# Patient Record
Sex: FEMALE | Race: WHITE | NOT HISPANIC OR LATINO | Employment: OTHER | URBAN - METROPOLITAN AREA
[De-identification: names, ages, dates, MRNs, and addresses within clinical notes are randomized per-mention and may not be internally consistent; named-entity substitution may affect disease eponyms.]

---

## 2023-03-08 DIAGNOSIS — F32.A DEPRESSION, UNSPECIFIED DEPRESSION TYPE: Primary | ICD-10-CM

## 2023-03-08 RX ORDER — SERTRALINE HYDROCHLORIDE 100 MG/1
100 TABLET, FILM COATED ORAL DAILY
Qty: 30 TABLET | Refills: 0 | Status: SHIPPED | OUTPATIENT
Start: 2023-03-08 | End: 2023-03-14 | Stop reason: SDUPTHER

## 2023-03-08 RX ORDER — SERTRALINE HYDROCHLORIDE 100 MG/1
TABLET, FILM COATED ORAL
COMMUNITY
Start: 2016-11-01 | End: 2023-03-08 | Stop reason: SDUPTHER

## 2023-03-08 NOTE — TELEPHONE ENCOUNTER
Requested Prescriptions     Pending Prescriptions Disp Refills    sertraline (Zoloft) 100 mg tablet       Sig: Take by mouth.

## 2023-03-14 DIAGNOSIS — F32.A DEPRESSION, UNSPECIFIED DEPRESSION TYPE: ICD-10-CM

## 2023-03-14 NOTE — TELEPHONE ENCOUNTER
Pt takes 2 sertraline 100 mg tablets at bedtime every day. Was sent 100mg tab w once daily sig.    Requested Prescriptions     Pending Prescriptions Disp Refills    sertraline (Zoloft) 100 mg tablet 60 tablet 0     Sig: Take 2 tablets (200 mg) by mouth once daily at bedtime.

## 2023-03-15 RX ORDER — SERTRALINE HYDROCHLORIDE 100 MG/1
200 TABLET, FILM COATED ORAL NIGHTLY
Qty: 60 TABLET | Refills: 0 | Status: SHIPPED | OUTPATIENT
Start: 2023-03-15 | End: 2023-04-18 | Stop reason: SDUPTHER

## 2023-04-18 ENCOUNTER — OFFICE VISIT (OUTPATIENT)
Dept: PRIMARY CARE | Facility: CLINIC | Age: 68
End: 2023-04-18
Payer: MEDICARE

## 2023-04-18 VITALS
DIASTOLIC BLOOD PRESSURE: 72 MMHG | RESPIRATION RATE: 16 BRPM | BODY MASS INDEX: 25.24 KG/M2 | OXYGEN SATURATION: 95 % | TEMPERATURE: 97.9 F | WEIGHT: 138 LBS | HEART RATE: 58 BPM | SYSTOLIC BLOOD PRESSURE: 120 MMHG

## 2023-04-18 DIAGNOSIS — I48.91 ATRIAL FIBRILLATION, UNSPECIFIED TYPE (MULTI): ICD-10-CM

## 2023-04-18 DIAGNOSIS — F32.A DEPRESSION, UNSPECIFIED DEPRESSION TYPE: ICD-10-CM

## 2023-04-18 DIAGNOSIS — Z00.00 ROUTINE GENERAL MEDICAL EXAMINATION AT HEALTH CARE FACILITY: Primary | ICD-10-CM

## 2023-04-18 DIAGNOSIS — E78.5 HYPERLIPIDEMIA, UNSPECIFIED HYPERLIPIDEMIA TYPE: ICD-10-CM

## 2023-04-18 DIAGNOSIS — N18.31 STAGE 3A CHRONIC KIDNEY DISEASE (MULTI): ICD-10-CM

## 2023-04-18 DIAGNOSIS — I25.810 CORONARY ARTERY DISEASE INVOLVING CORONARY BYPASS GRAFT OF NATIVE HEART, UNSPECIFIED WHETHER ANGINA PRESENT: ICD-10-CM

## 2023-04-18 DIAGNOSIS — J44.9 CHRONIC OBSTRUCTIVE PULMONARY DISEASE, UNSPECIFIED COPD TYPE (MULTI): ICD-10-CM

## 2023-04-18 DIAGNOSIS — F43.9 STRESS AT HOME: ICD-10-CM

## 2023-04-18 DIAGNOSIS — E55.9 VITAMIN D DEFICIENCY: ICD-10-CM

## 2023-04-18 DIAGNOSIS — I10 HYPERTENSION, UNSPECIFIED TYPE: ICD-10-CM

## 2023-04-18 DIAGNOSIS — F41.1 GENERALIZED ANXIETY DISORDER: ICD-10-CM

## 2023-04-18 PROCEDURE — G0439 PPPS, SUBSEQ VISIT: HCPCS | Performed by: FAMILY MEDICINE

## 2023-04-18 PROCEDURE — 99214 OFFICE O/P EST MOD 30 MIN: CPT | Performed by: FAMILY MEDICINE

## 2023-04-18 RX ORDER — APIXABAN 5 MG/1
1 TABLET, FILM COATED ORAL 2 TIMES DAILY
COMMUNITY
Start: 2023-02-14 | End: 2023-04-18

## 2023-04-18 RX ORDER — LOSARTAN POTASSIUM 25 MG/1
25 TABLET ORAL DAILY
COMMUNITY

## 2023-04-18 RX ORDER — FLUTICASONE PROPIONATE AND SALMETEROL 250; 50 UG/1; UG/1
1 POWDER RESPIRATORY (INHALATION)
COMMUNITY

## 2023-04-18 RX ORDER — ATORVASTATIN CALCIUM 40 MG/1
TABLET, FILM COATED ORAL
COMMUNITY
Start: 2023-04-15

## 2023-04-18 RX ORDER — ALENDRONATE SODIUM 70 MG/1
TABLET ORAL
COMMUNITY
Start: 2021-08-23

## 2023-04-18 RX ORDER — IPRATROPIUM BROMIDE AND ALBUTEROL SULFATE 2.5; .5 MG/3ML; MG/3ML
3 SOLUTION RESPIRATORY (INHALATION)
COMMUNITY

## 2023-04-18 RX ORDER — SERTRALINE HYDROCHLORIDE 100 MG/1
200 TABLET, FILM COATED ORAL NIGHTLY
Qty: 180 TABLET | Refills: 1 | Status: SHIPPED | OUTPATIENT
Start: 2023-04-18 | End: 2023-10-09

## 2023-04-18 RX ORDER — FUROSEMIDE 40 MG/1
80 TABLET ORAL DAILY
COMMUNITY

## 2023-04-18 RX ORDER — NAPROXEN SODIUM 220 MG/1
81 TABLET, FILM COATED ORAL DAILY
COMMUNITY
Start: 2022-12-27

## 2023-04-18 RX ORDER — POTASSIUM CHLORIDE 750 MG/1
10 TABLET, FILM COATED, EXTENDED RELEASE ORAL 2 TIMES DAILY
COMMUNITY

## 2023-04-18 RX ORDER — PANTOPRAZOLE SODIUM 20 MG/1
40 TABLET, DELAYED RELEASE ORAL 2 TIMES DAILY
COMMUNITY
End: 2024-03-11 | Stop reason: SDUPTHER

## 2023-04-18 RX ORDER — ALBUTEROL SULFATE 90 UG/1
AEROSOL, METERED RESPIRATORY (INHALATION)
COMMUNITY

## 2023-04-18 RX ORDER — DOFETILIDE 0.12 MG/1
125 CAPSULE ORAL 2 TIMES DAILY
COMMUNITY

## 2023-04-18 RX ORDER — AMLODIPINE BESYLATE 2.5 MG/1
1 TABLET ORAL DAILY
COMMUNITY
Start: 2022-12-15

## 2023-04-18 RX ORDER — ALBUTEROL SULFATE 0.83 MG/ML
SOLUTION RESPIRATORY (INHALATION)
COMMUNITY
Start: 2022-06-24

## 2023-04-18 RX ORDER — METOPROLOL SUCCINATE 50 MG/1
50 TABLET, EXTENDED RELEASE ORAL 2 TIMES DAILY
COMMUNITY

## 2023-04-18 ASSESSMENT — PATIENT HEALTH QUESTIONNAIRE - PHQ9
2. FEELING DOWN, DEPRESSED OR HOPELESS: SEVERAL DAYS
10. IF YOU CHECKED OFF ANY PROBLEMS, HOW DIFFICULT HAVE THESE PROBLEMS MADE IT FOR YOU TO DO YOUR WORK, TAKE CARE OF THINGS AT HOME, OR GET ALONG WITH OTHER PEOPLE: NOT DIFFICULT AT ALL
SUM OF ALL RESPONSES TO PHQ9 QUESTIONS 1 AND 2: 1
1. LITTLE INTEREST OR PLEASURE IN DOING THINGS: NOT AT ALL

## 2023-04-18 ASSESSMENT — ENCOUNTER SYMPTOMS
OCCASIONAL FEELINGS OF UNSTEADINESS: 0
DEPRESSION: 0
LOSS OF SENSATION IN FEET: 0

## 2023-04-18 ASSESSMENT — ACTIVITIES OF DAILY LIVING (ADL)
MANAGING_FINANCES: INDEPENDENT
DRESSING: INDEPENDENT
BATHING: INDEPENDENT
GROCERY_SHOPPING: INDEPENDENT
TAKING_MEDICATION: INDEPENDENT
DOING_HOUSEWORK: INDEPENDENT

## 2023-04-18 NOTE — PROGRESS NOTES
Subjective   Reason for Visit: Lit Mackay is an 67 y.o. female here for a Medicare Wellness visit.          Reviewed all medications by prescribing practitioner or clinical pharmacist (such as prescriptions, OTCs, herbal therapies and supplements) and documented in the medical record.    HPI    Patient Self Assessment of Health Status  Patient Self Assessment: Fair    Nutrition and Exercise  Current Diet: Well Balanced Diet  Adequate Fluid Intake: Yes  Caffeine: Yes  Exercise Frequency: Regularly         Home Safety Risk Factors: None    Patient Care Team:  Rob Gabriel DO as PCP - General  [facility]     Review of Systems    Objective   Vitals:  /72   Pulse 58   Temp 36.6 °C (97.9 °F)   Resp 16   Wt 62.6 kg (138 lb)   LMP  (LMP Unknown)   SpO2 95%   BMI 25.24 kg/m²       Physical Exam    Assessment/Plan   Problem List Items Addressed This Visit    None

## 2023-04-18 NOTE — PROGRESS NOTES
Subjective   Reason for Visit: Lit Mackay is an 67 y.o. female here for a Medicare Wellness visit.               HPI  Patient comes in today for Medicare wellness exam.  Since she was here last July she had open heart surgery on December 20, 2022 with CABG x4 and mitral valve repair.  Afterwards she had some problems with heart arrhythmia and she is now on Tikosyn for that.  She is currently in cardiac rehab 3 times a week.    Also significant was her daughter Georgette in [place] passed away 11/20/2022 from mental health and drug issues.        7/7/2022  The patient is being seen for follow-up after hospitalization. She was hospitalized at [facility]. The date of admission: 6/26/2022, date of discharge: 6/28/2022. Diagnosis: COPD exacerbation. She was discharged to home. She was discharged on the following medications: Albuterol Sulfate 0.083% Inhalation Solution, Albuterol Sulfate  MCG/ACT Inhaler, Alendronate sodium 70 MG, Benzonatate 100 MG, and 2 L of Oxygen. The patient is currently asymptomatic.     Patient comes in today for follow-up from admission to [facility] for acute exacerbation COPD. She was admitted 6/26/2022 and released 6/28/2022 after being treated with IV steroids, aerosols and Levaquin. She tells me she is now on home oxygen at 2 L per nasal cannula. She has no pulmonologist currently. She used to see Dr. Sharp at [facility]. She was given Trelegy prescription to go home with but it is over $400 and she cannot afford it.    Her depression is stable. Besides her own health issues she has had a  at home recovering from a stroke.    6/24/2022  Patient comes in today for checkup and refill of medication. She is under a great deal of stress at home as her  is now home after having a stroke. [facility]. It is quite overwhelming. She needs refills on her medications. She is due for Medicare wellness exam next  month.    5/24/2022  Patient comes in today for follow-up from her recent ER visit to [facility]. She went there with symptoms of shortness of breath. Her entire work-up was negative. She believes she just got run down running back and forth to visit her  who [facility] and then the following day had a stroke. He is now in rehab in [facility] and she has been running back and forth to the hospital for the past month and a half. After evaluation they discharged her from the ER on an inhaler, prednisone and an antibiotic. Today she is feeling pretty good. Despite everything going on and the current stress her depression is stable.    3/4/2022  Patient presents today for 6-month checkup and refill of meds. She currently is without complaints except for a sore throat. She watches her 3 young grandchildren on a regular basis. She states that she is taking her medicines as directed and is not having any side effects from them.    9/14/2021  Patient presents today for 1-month checkup and refill of meds. She currently is without complaints. She states that she is taking her medicines as directed and is not having any side effects from them. Her blood pressure is doing very well today. We will continue the same dose.    Patient was recently down in [place] with her sister on vacation and since she returned she has had a cough that just is not going away. She denies any exposure to Covid.    Patient's depression and kidney disease is stable.    8/9/2021  Patient comes in today for follow-up on her blood pressure. She is actually doing quite well. She states her blood pressures have been averaging in the 130s over 80s. Today it was 132/80. Her weight has remained the same. She has not been experiencing any side effects from the medicine. She has not heard from her daughter since last visit.    7/26/2021  Patient comes in today for evaluation of her blood pressure. She also is  here for Medicare wellness exam. She has been getting high blood pressure readings. At the ear nose and throat doctor last week she had a reading of 180/104. It has been high and low. She has a cuff at home and has been getting a mixture readings. She is under a lot of stress at home. Her daughter who lives in [place] and is a drug addict has been acting up lately. She has been very demanding and confrontational with the patient. Patient has been trying to help her daughter because she knows she has problems but the daughter has not been pleasant in any way. As an example she let me listen to a voicemail the daughter had sent which was very derogatory, filled with curse words demanding the patient give her her credit card number so she could use it.    3/26/2021  Patient is seen today as a telemedicine visit rendered via realtime interactive audio/video [email or web address] services as we are currently in the middle of a coronavirus pandemic worldwide. The patient was informed about the telehealth clinical encounter including benefits to avoiding travel and limitations to the assessment. In person care may be recommended if needed. Telehealth sessions are not being recorded and personal health information is protected.    Patient returns today for follow-up on her upper respiratory symptoms. She still has a cough and some hoarseness but overall she is feeling better. She thinks the cough now is due to postnasal drip. Will have her use a nasal spray to see if we can dry up the nose.    Patient is also due for follow-up with GI for colonic polyps. She sees Dr. Frances Ortiz. We will place an order.    3/16/21  Patient is seen today as a telemedicine visit rendered via realtime interactive audio/video [email or web address] services as we are currently in the middle of a coronavirus pandemic worldwide. The patient was informed about the telehealth clinical encounter including benefits to avoiding travel and limitations to the assessment. In person  care may be recommended if needed. Telehealth sessions are not being recorded and personal health information is protected.    Patient presents today with upper respiratory symptoms for the last 2 days. She describes a dry hacking cough but no shortness of breath. She denies fever, loss of smell or taste, GI symptoms or sore throat. She has not yet received the Covid vaccine but she denies any Covid exposure.    2/2/2021  Patient is seen today as a telemedicine visit rendered via realtime interactive audio/video [email or web address] services as we are currently in the middle of a coronavirus pandemic worldwide. The patient was informed about the telehealth clinical encounter including benefits to avoiding travel and limitations to the assessment. In person care may be recommended if needed. Telehealth sessions are not being recorded and personal health information is protected.    Patient presents today with a cough and sore throat that began this past Saturday. She has had notable postnasal drip. She denies fever or GI symptoms. She denies exposure to the coronavirus. She states that it has been she and her  alone at home the last several weeks. She denies being around anyone else.    1/7/21  Patient comes in today for evaluation of several issues. She first of all had what sounds like was in a panic attack in [employer] a few weeks ago. She was in the store and suddenly felt like she couldn't breathe and broke out in a sweat. She had tightness in her chest and her jaws felt tight. She left the store and went home and took 2 Excedrin and some Tums and it slowly went away. She has had nothing since then. She has felt very stressed out because of family drama. Her daughter is supposed to be moving back from [place] which she is not looking forward to. She has also been having some problems with her 's son who apparently got a hold of their credit card and had been making unauthorized purchases. All of this has her quite  "upset.    Patient also experiencing some discomfort behind her left knee. She denies trauma or injury to the leg.    Patient's lung cancer has been stable.    10/20/2020  Patient comes in today for evaluation of multiple issues. First of all the drama has returned to her life. Her daughter Yaneth moved back from [place] unexpectedly where she had been living a life of alcohol and drugs. She apparently showed up at the patient's door several weeks ago. She had come with her  who apparently left a few days later and drove back to [place] without her. Without extensive details, apparently Yaneth created a lot of turmoil at the patient's house and the patient had to have her removed and now she has a restraining order against her. She apparently showed up at the house last night in the early morning hours and patient called the police and Yaneth is now in [facility]. All of this is obviously very upsetting to the patient. Her blood pressure is on the high side today.    Patient also has cough and congestion which she's had for the last several days. She denies wheezing but her cough has been productive. She denies fever or chills.    3/10/2020  Patient comes in today complaining of cough and congestion since last Thursday. She states she has been \"on the couch\" for the last 4 days. She has been coughing almost nonstop along with ear pain. She has been producing phlegm and her stomach has been hurting because of the cough.    1/6/2020  Patient comes in today complaining of diarrhea for the last 4 days. The first 2 days were nothing but water and on the third day she took Pepto-Bismol which did help a little bit but now this morning she is concerned because her stools are black. She denies any travel history, recent antibiotic use or eating any spoiled food. She did have several foods over the holidays which she normally doesn't eat but she couldn't pick out one that may have caused this.    11/25/19  Patient comes in " "today complaining of left ear pain and pain down the left side of her neck and left jaw pain since Friday. She also complains of sinus congestion and drainage. She denies fever or stomach issues. She has had a cough.    11/7/19  Patient comes in today complaining of cold symptoms that began on Monday of this week. She has also been experiencing a dry hacking cough. She denies fever, sore throat or GI symptoms.    Today she feels like the Wellbutrin is no longer helping and it is causing her to \"sweat badly\". She also claims that she \"shakes inside my body every morning when I get up\". She would like to get off of the medication.    10/11/19  Patient comes in today for follow-up on her depression. Overall she is feeling somewhat better but feels like it is not yet the right dose. She definitely feels like the Wellbutrin is helping.    Patient needs an order for mammogram and would like to get a flu vaccine today.    9/12/19  Patient comes in today for follow-up on her depression. The Zoloft is working very well taking care of her anger however isn't doing much for her depression. Everything in her life is pretty much the same. She just is not feeling better.    8/12/19  Patient comes in today for follow-up on her depression. So far the medication isn't helping much. She has had increased stress however by her daughter Yaneth in [place]. Apparently her daughter became homeless in the last 2 weeks and is now back \"in the [facility] with the meth-heads\". She states that she has been constantly lied to him by not only her daughter but her daughter's  who claims that her daughter shot him in the hip. I explained to the patient that I don't know how that would be possible because if that actually happened her daughter should be in [facility] for shooting him. She states that her daughter calls her almost every day requesting money etc. Patient has already made the decision not to allow her daughter to come back home to " "live here with her. She has 3 other granddaughters 2, 5 and 12 years old here in the area that she doesn't want exposed to her daughter. Patient has also talked to her daughter in [place], [place], who told the patient when they were growing up, she constantly had to cover for Yaneth and her problems.     7/10/2019   The patient is a 63 year old female who presents for a Recheck of Medication Evaluation. The patient does not feel well, has decreased energy level and is sleeping poorly. Note for \"Medication Evaluation\": Patient comes in today for follow up of cold symptoms.  They are pretty much gone now.  Her chest x-ray was negative. She is currently afebrile.  She denies GI symptoms.    She is also here for follow-up on her sertraline.  It seems to be working well for her currently.  She has also been seeing a counselor which has been helping as well.  The counselor has suggested that she see Kelin for her daughter in [place].  She is thinking about doing so.      Past Hx:  Patient comes in today for follow-up on her medications.  He doesn't think the Lexapro is working in fact she thinks sometimes it makes it worse.  She would like to try different antidepressant medicine.  So far she has not like the Paxil, Wellbutrin, Lexapro, Cymbalta, amitriptyline, or fluoxetine.  The one that did work best for her was sertraline but it made her sleepy at the 100 mg dose.  She and her  are leaving on a cruise this Thursday.  She has flight anxiety and would like something for the airplane flight.  She claims she does not need anything for motion sickness. She ried to get her daughter home from [place] to help her with her addiction problems however the daughter missed her flight. Her daughter and her  are both into drugs and they have a daughter whose care has been taken over by the 's brother who is raising her.    Patient also complaining of some aches in her upper arms.  She is on " atorvastatin.    Past Hx:   She continues to be under some stress as she tried to have her daughter come home from [place] and help her with her addiction however that didn't work out so well and she sent her back.  In the past she quit taking Zoloft because it was making her tired.  She stopped a couple months ago.  She did find however that it was helping her anger issues.  She found out over the fourth of July holiday about her anger issues as she blew up at her daughter-in-law.  It also didn't help that it would've been her son Shon's birthday in June who committed suicide at 45yo 8/2/17 (OD heroin) and his favorite holiday was the Fourth of July.         Area small    Patient Care Team:  Rob Gabriel DO as PCP - General  [facility] DO [facility]     Review of Systems   All other systems reviewed and are negative.      Objective   Vitals:  /72   Pulse 58   Temp 36.6 °C (97.9 °F)   Resp 16   Wt 62.6 kg (138 lb)   LMP  (LMP Unknown)   SpO2 95%   BMI 25.24 kg/m²       Physical Exam  Vitals reviewed.   Constitutional:       Appearance: She is well-developed.   HENT:      Head: Normocephalic and atraumatic.      Right Ear: Tympanic membrane, ear canal and external ear normal.      Left Ear: Tympanic membrane, ear canal and external ear normal.      Nose: Nose normal.      Mouth/Throat:      Mouth: Mucous membranes are moist.      Pharynx: Oropharynx is clear.   Eyes:      Extraocular Movements: Extraocular movements intact.      Conjunctiva/sclera: Conjunctivae normal.      Pupils: Pupils are equal, round, and reactive to light.      Comments: Patient wears glasses   Cardiovascular:      Rate and Rhythm: Normal rate and regular rhythm.      Heart sounds: Normal heart sounds. No murmur heard.     Comments: Sternal chest scar from bypass surgery  Pulmonary:      Effort: Pulmonary effort is normal.      Breath sounds: Normal breath sounds. No wheezing.   Abdominal:       General: Abdomen is flat. Bowel sounds are normal.      Palpations: Abdomen is soft.   Musculoskeletal:         General: Normal range of motion.   Skin:     General: Skin is warm and dry.   Neurological:      General: No focal deficit present.      Mental Status: She is alert and oriented to person, place, and time. Mental status is at baseline.   Psychiatric:         Mood and Affect: Mood normal.         Behavior: Behavior normal.         Thought Content: Thought content normal.         Judgment: Judgment normal.         Assessment/Plan   Problem List Items Addressed This Visit       COPD (chronic obstructive pulmonary disease) (CMS/[facility])    Overview     COPD is stable         Depression    Relevant Medications    sertraline (Zoloft) 100 mg tablet    Generalized anxiety disorder    Hyperlipidemia    Hypertension    Stage 3a chronic kidney disease    Stress at home    Vitamin D deficiency    Routine general medical examination at health care facility - Primary    Coronary artery disease involving coronary bypass graft of native heart    Relevant Medications    amLODIPine (Norvasc) 2.5 mg tablet    metoprolol succinate XL (Toprol-XL) 50 mg 24 hr tablet    Atrial fibrillation (CMS/[facility])    Overview     Being followed by [facility], currently on Tikosyn.         Relevant Medications    amLODIPine (Norvasc) 2.5 mg tablet    metoprolol succinate XL (Toprol-XL) 50 mg 24 hr tablet   Continue current meds as directed.  Follow up in 3 months for recheck if all remains stable, sooner if problems arise.  Continue cardiac rehab.  Continue follow-up with multiple specialties as scheduled  Medication list reconciled.  Thank you for visiting today!      Professional services: Some of this note was completed using Dragon voice technology and sometimes the software misinterprets words. This may include unintended errors with respect to translation of words, typographical errors or grammar errors which may not have been identified prior to  finalization of the chart note. Please take this into account when reading the note. Thank you.

## 2023-04-19 ENCOUNTER — PATIENT OUTREACH (OUTPATIENT)
Dept: CARE COORDINATION | Facility: CLINIC | Age: 68
End: 2023-04-19
Payer: MEDICARE

## 2023-04-19 NOTE — PROGRESS NOTES
Spoke with patient today about recent wellness visit done in PCP office.     I introduced myself as the Patient Navigator. I am here to help with any obstacles that are in the way of receiving the proper care. I am able to assist with appointments issues, medication coverage issues, community programs which can include help with meals, medical supplies, senior programs, housing issues and/or transportation.    I went over any need to help schedule any referral and/or testing that was ordered at appointment.     At this time the patient states no need for any assistance. I gave my direct number [phone] if anything arises in the future they may contact me directly.

## 2023-04-20 PROBLEM — C34.90 LUNG CANCER (MULTI): Status: ACTIVE | Noted: 2023-04-20

## 2023-04-20 PROBLEM — E78.5 HYPERLIPIDEMIA: Status: ACTIVE | Noted: 2023-04-20

## 2023-04-20 PROBLEM — I25.810 CORONARY ARTERY DISEASE INVOLVING CORONARY BYPASS GRAFT OF NATIVE HEART: Status: ACTIVE | Noted: 2023-04-20

## 2023-04-20 PROBLEM — I48.91 ATRIAL FIBRILLATION (MULTI): Status: ACTIVE | Noted: 2023-04-20

## 2023-04-20 PROBLEM — E55.9 VITAMIN D DEFICIENCY: Status: ACTIVE | Noted: 2023-04-20

## 2023-04-20 PROBLEM — F43.9 STRESS AT HOME: Status: ACTIVE | Noted: 2023-04-20

## 2023-04-20 PROBLEM — Z00.00 ROUTINE GENERAL MEDICAL EXAMINATION AT HEALTH CARE FACILITY: Status: ACTIVE | Noted: 2023-04-20

## 2023-04-20 PROBLEM — N18.31 STAGE 3A CHRONIC KIDNEY DISEASE (MULTI): Status: ACTIVE | Noted: 2023-04-20

## 2023-04-20 PROBLEM — F32.A DEPRESSION: Status: ACTIVE | Noted: 2023-04-20

## 2023-04-20 PROBLEM — I10 HYPERTENSION: Status: ACTIVE | Noted: 2023-04-20

## 2023-04-20 PROBLEM — F41.1 GENERALIZED ANXIETY DISORDER: Status: ACTIVE | Noted: 2023-04-20

## 2023-04-20 PROBLEM — F17.200 NICOTINE DEPENDENCE: Status: ACTIVE | Noted: 2023-04-20

## 2023-04-20 PROBLEM — J44.9 COPD (CHRONIC OBSTRUCTIVE PULMONARY DISEASE) (MULTI): Status: ACTIVE | Noted: 2023-04-20

## 2023-06-20 ENCOUNTER — TELEPHONE (OUTPATIENT)
Dept: PRIMARY CARE | Facility: CLINIC | Age: 68
End: 2023-06-20
Payer: MEDICARE

## 2023-06-20 DIAGNOSIS — I25.810 CORONARY ARTERY DISEASE INVOLVING CORONARY BYPASS GRAFT OF NATIVE HEART, UNSPECIFIED WHETHER ANGINA PRESENT: ICD-10-CM

## 2023-06-20 DIAGNOSIS — I48.91 ATRIAL FIBRILLATION, UNSPECIFIED TYPE (MULTI): Primary | ICD-10-CM

## 2023-06-20 NOTE — TELEPHONE ENCOUNTER
Patient is calling stating that she is having her teeth cleaned on 7/18/23 and was told by her cardiologist that she needs to be on antibiotics for this procedure.  Patient is asking if Dr. Gabriel can send this into her pharmacy for her.  Patient can be reached at [phone].    Thank You

## 2023-06-22 RX ORDER — CLINDAMYCIN HYDROCHLORIDE 300 MG/1
CAPSULE ORAL
Qty: 10 CAPSULE | Refills: 0 | Status: SHIPPED | OUTPATIENT
Start: 2023-06-22

## 2023-06-22 NOTE — TELEPHONE ENCOUNTER
Please notify patient that a prescription for clindamycin was sent to her pharmacy.  She should take 1 capsule 1 hour before the dental procedure and 1 capsule 1 hour after the procedure.  I sent in 10 pills which would be enough for 5 visits to the dentist.  Thank you.

## 2023-07-25 ENCOUNTER — OFFICE VISIT (OUTPATIENT)
Dept: PRIMARY CARE | Facility: CLINIC | Age: 68
End: 2023-07-25
Payer: MEDICARE

## 2023-07-25 VITALS
RESPIRATION RATE: 16 BRPM | WEIGHT: 138 LBS | OXYGEN SATURATION: 93 % | HEART RATE: 63 BPM | BODY MASS INDEX: 25.24 KG/M2 | DIASTOLIC BLOOD PRESSURE: 71 MMHG | TEMPERATURE: 97.7 F | SYSTOLIC BLOOD PRESSURE: 112 MMHG

## 2023-07-25 DIAGNOSIS — J20.9 ACUTE BRONCHITIS, UNSPECIFIED ORGANISM: Primary | ICD-10-CM

## 2023-07-25 PROCEDURE — 99213 OFFICE O/P EST LOW 20 MIN: CPT | Performed by: FAMILY MEDICINE

## 2023-07-25 RX ORDER — AZITHROMYCIN 250 MG/1
TABLET, FILM COATED ORAL
Qty: 6 TABLET | Refills: 0 | Status: SHIPPED | OUTPATIENT
Start: 2023-07-25 | End: 2023-07-30

## 2023-07-25 RX ORDER — BENZONATATE 200 MG/1
200 CAPSULE ORAL 3 TIMES DAILY PRN
Qty: 30 CAPSULE | Refills: 0 | Status: SHIPPED | OUTPATIENT
Start: 2023-07-25 | End: 2023-08-04

## 2023-07-25 NOTE — PROGRESS NOTES
Subjective   Patient ID: Lit Mackay is a 67 y.o. female who presents for Cough (And congestion in the throat. X 4-5 days. Bringing up thick phlegm.  ).    HPI   Patient comes in today complaining of cough and congestion.  She states his symptoms began about 4 days ago.  They cough has been productive of yellow phlegm.  She has also been tired and fatigued.  She denies any exposure to COVID and claims no one else has been sick around her.    4/18/2023  Patient comes in today for Medicare wellness exam.  Since she was here last July she had open heart surgery on December 20, 2022 with CABG x4 and mitral valve repair.  Afterwards she had some problems with heart arrhythmia and she is now on Tikosyn for that.  She is currently in cardiac rehab 3 times a week.    Also significant was her daughter Yaneth in [place] passed away 11/20/2022 from mental health and drug issues.        7/7/2022  The patient is being seen for follow-up after hospitalization. She was hospitalized at [facility]. The date of admission: 6/26/2022, date of discharge: 6/28/2022. Diagnosis: COPD exacerbation. She was discharged to home. She was discharged on the following medications: Albuterol Sulfate 0.083% Inhalation Solution, Albuterol Sulfate  MCG/ACT Inhaler, Alendronate sodium 70 MG, Benzonatate 100 MG, and 2 L of Oxygen. The patient is currently asymptomatic.     Patient comes in today for follow-up from admission to [facility] for acute exacerbation COPD. She was admitted 6/26/2022 and released 6/28/2022 after being treated with IV steroids, aerosols and Levaquin. She tells me she is now on home oxygen at 2 L per nasal cannula. She has no pulmonologist currently. She used to see Dr. Sharp at [facility]. She was given Trelegy prescription to go home with but it is over $400 and she cannot afford it.    Her depression is stable. Besides her own health issues she has had a  at home recovering from a  stroke.    6/24/2022  Patient comes in today for checkup and refill of medication. She is under a great deal of stress at home as her  is now home after having a stroke. [facility] is [facility] and [facility]. It is quite overwhelming. She needs refills on her medications. She is due for Medicare wellness exam next month.    5/24/2022  Patient comes in today for follow-up from her recent ER visit to [facility]. She went there with symptoms of shortness of breath. Her entire work-up was negative. She believes she just got run down running back and forth to visit her  who [facility] and then the following day had a stroke. He is now in rehab in [facility] and she has been running back and forth to the hospital for the past month and a half. After evaluation they discharged her from the ER on an inhaler, prednisone and an antibiotic. Today she is feeling pretty good. Despite everything going on and the current stress her depression is stable.    3/4/2022  Patient presents today for 6-month checkup and refill of meds. She currently is without complaints except for a sore throat. She watches her 3 young grandchildren on a regular basis. She states that she is taking her medicines as directed and is not having any side effects from them.    9/14/2021  Patient presents today for 1-month checkup and refill of meds. She currently is without complaints. She states that she is taking her medicines as directed and is not having any side effects from them. Her blood pressure is doing very well today. We will continue the same dose.    Patient was recently down in [place] with her sister on vacation and since she returned she has had a cough that just is not going away. She denies any exposure to Covid.    Patient's depression and kidney disease is stable.    8/9/2021  Patient comes in today for follow-up on her blood pressure. She is actually doing  quite well. She states her blood pressures have been averaging in the 130s over 80s. Today it was 132/80. Her weight has remained the same. She has not been experiencing any side effects from the medicine. She has not heard from her daughter since last visit.    7/26/2021  Patient comes in today for evaluation of her blood pressure. She also is here for Medicare wellness exam. She has been getting high blood pressure readings. At the ear nose and throat doctor last week she had a reading of 180/104. It has been high and low. She has a cuff at home and has been getting a mixture readings. She is under a lot of stress at home. Her daughter who lives in [place] and is a drug addict has been acting up lately. She has been very demanding and confrontational with the patient. Patient has been trying to help her daughter because she knows she has problems but the daughter has not been pleasant in any way. As an example she let me listen to a voicemail the daughter had sent which was very derogatory, filled with curse words demanding the patient give her her credit card number so she could use it.    3/26/2021  Patient is seen today as a telemedicine visit rendered via realtime interactive audio/video [email or web address] services as we are currently in the middle of a coronavirus pandemic worldwide. The patient was informed about the telehealth clinical encounter including benefits to avoiding travel and limitations to the assessment. In person care may be recommended if needed. Telehealth sessions are not being recorded and personal health information is protected.    Patient returns today for follow-up on her upper respiratory symptoms. She still has a cough and some hoarseness but overall she is feeling better. She thinks the cough now is due to postnasal drip. Will have her use a nasal spray to see if we can dry up the nose.    Patient is also due for follow-up with GI for colonic polyps. She sees Dr. Frances Ortiz. We will place an  order.    3/16/21  Patient is seen today as a telemedicine visit rendered via realtime interactive audio/video [email or web address] services as we are currently in the middle of a coronavirus pandemic worldwide. The patient was informed about the telehealth clinical encounter including benefits to avoiding travel and limitations to the assessment. In person care may be recommended if needed. Telehealth sessions are not being recorded and personal health information is protected.    Patient presents today with upper respiratory symptoms for the last 2 days. She describes a dry hacking cough but no shortness of breath. She denies fever, loss of smell or taste, GI symptoms or sore throat. She has not yet received the Covid vaccine but she denies any Covid exposure.    2/2/2021  Patient is seen today as a telemedicine visit rendered via realtime interactive audio/video [email or web address] services as we are currently in the middle of a coronavirus pandemic worldwide. The patient was informed about the telehealth clinical encounter including benefits to avoiding travel and limitations to the assessment. In person care may be recommended if needed. Telehealth sessions are not being recorded and personal health information is protected.    Patient presents today with a cough and sore throat that began this past Saturday. She has had notable postnasal drip. She denies fever or GI symptoms. She denies exposure to the coronavirus. She states that it has been she and her  alone at home the last several weeks. She denies being around anyone else.    1/7/21  Patient comes in today for evaluation of several issues. She first of all had what sounds like was in a panic attack in [employer] a few weeks ago. She was in the store and suddenly felt like she couldn't breathe and broke out in a sweat. She had tightness in her chest and her jaws felt tight. She left the store and went home and took 2 Excedrin and some Tums and it slowly went away. She has had  "nothing since then. She has felt very stressed out because of family drama. Her daughter is supposed to be moving back from [place] which she is not looking forward to. She has also been having some problems with her 's son who apparently got a hold of their credit card and had been making unauthorized purchases. All of this has her quite upset.    Patient also experiencing some discomfort behind her left knee. She denies trauma or injury to the leg.    Patient's lung cancer has been stable.    10/20/2020  Patient comes in today for evaluation of multiple issues. First of all the drama has returned to her life. Her daughter Yaneth moved back from [place] unexpectedly where she had been living a life of alcohol and drugs. She apparently showed up at the patient's door several weeks ago. She had come with her  who apparently left a few days later and drove back to [place] without her. Without extensive details, apparently Yaneth created a lot of turmoil at the patient's house and the patient had to have her removed and now she has a restraining order against her. She apparently showed up at the house last night in the early morning hours and patient called the police and Yaneth is now in [facility]. All of this is obviously very upsetting to the patient. Her blood pressure is on the high side today.    Patient also has cough and congestion which she's had for the last several days. She denies wheezing but her cough has been productive. She denies fever or chills.    3/10/2020  Patient comes in today complaining of cough and congestion since last Thursday. She states she has been \"on the couch\" for the last 4 days. She has been coughing almost nonstop along with ear pain. She has been producing phlegm and her stomach has been hurting because of the cough.    1/6/2020  Patient comes in today complaining of diarrhea for the last 4 days. The first 2 days were nothing but water and on the third day she took " "Pepto-Bismol which did help a little bit but now this morning she is concerned because her stools are black. She denies any travel history, recent antibiotic use or eating any spoiled food. She did have several foods over the holidays which she normally doesn't eat but she couldn't pick out one that may have caused this.    11/25/19  Patient comes in today complaining of left ear pain and pain down the left side of her neck and left jaw pain since Friday. She also complains of sinus congestion and drainage. She denies fever or stomach issues. She has had a cough.    11/7/19  Patient comes in today complaining of cold symptoms that began on Monday of this week. She has also been experiencing a dry hacking cough. She denies fever, sore throat or GI symptoms.    Today she feels like the Wellbutrin is no longer helping and it is causing her to \"sweat badly\". She also claims that she \"shakes inside my body every morning when I get up\". She would like to get off of the medication.    10/11/19  Patient comes in today for follow-up on her depression. Overall she is feeling somewhat better but feels like it is not yet the right dose. She definitely feels like the Wellbutrin is helping.    Patient needs an order for mammogram and would like to get a flu vaccine today.    9/12/19  Patient comes in today for follow-up on her depression. The Zoloft is working very well taking care of her anger however isn't doing much for her depression. Everything in her life is pretty much the same. She just is not feeling better.    8/12/19  Patient comes in today for follow-up on her depression. So far the medication isn't helping much. She has had increased stress however by her daughter Yaneth in [place]. Apparently her daughter became homeless in the last 2 weeks and is now back \"[place] the [place] with the meth-heads\". She states that she has been constantly lied to him by not only her daughter but her daughter's  who claims that " "her daughter shot him in the hip. I explained to the patient that I don't know how that would be possible because if that actually happened her daughter should be in [facility] for shooting him. She states that her daughter calls her almost every day requesting money etc. Patient has already made the decision not to allow her daughter to come back home to live here with her. She has 3 other granddaughters 2, 5 and 12 years old here in the area that she doesn't want exposed to her daughter. Patient has also talked to her daughter in [facility], who told the patient when they were growing up, she constantly had to cover for Yaneth and her problems.     7/10/2019   The patient is a 63 year old female who presents for a Recheck of Medication Evaluation. The patient does not feel well, has decreased energy level and is sleeping poorly. Note for \"Medication Evaluation\": Patient comes in today for follow up of cold symptoms.  They are pretty much gone now.  Her chest x-ray was negative. She is currently afebrile.  She denies GI symptoms.    She is also here for follow-up on her sertraline.  It seems to be working well for her currently.  She has also been seeing a counselor which has been helping as well.  The counselor has suggested that she see al-Rubenreji for her daughter in [place].  She is thinking about doing so.      Past Hx:  Patient comes in today for follow-up on her medications.  He doesn't think the Lexapro is working in fact she thinks sometimes it makes it worse.  She would like to try different antidepressant medicine.  So far she has not like the Paxil, Wellbutrin, Lexapro, Cymbalta, amitriptyline, or fluoxetine.  The one that did work best for her was sertraline but it made her sleepy at the 100 mg dose.  She and her  are leaving on a cruise this Thursday.  She has flight anxiety and would like something for the airplane flight.  She claims she does not need anything for motion sickness. She ried to " get her daughter home from [place] to help her with her addiction problems however the daughter missed her flight. Her daughter and her  are both into drugs and they have a daughter whose care has been taken over by the 's brother who is raising her.    Patient also complaining of some aches in her upper arms.  She is on atorvastatin.    Past Hx:   She continues to be under some stress as she tried to have her daughter come home from [place] and help her with her addiction however that didn't work out so well and she sent her back.  In the past she quit taking Zoloft because it was making her tired.  She stopped a couple months ago.  She did find however that it was helping her anger issues.  She found out over the fourth of July holiday about her anger issues as she blew up at her daughter-in-law.  It also didn't help that it would've been her son Shon's birthday in June who committed suicide at 43yo 8/2/17 (OD heroin) and his favorite holiday was the Fourth of July.     Review of Systems   All other systems reviewed and are negative.      Objective   /71   Pulse 63   Temp 36.5 °C (97.7 °F)   Resp 16   Wt 62.6 kg (138 lb)   LMP  (LMP Unknown)   SpO2 93%   BMI 25.24 kg/m²     Physical Exam  Vitals reviewed.   Constitutional:       Appearance: She is well-developed.   HENT:      Head: Normocephalic and atraumatic.      Right Ear: Tympanic membrane, ear canal and external ear normal.      Left Ear: Tympanic membrane, ear canal and external ear normal.      Nose: Nose normal.      Mouth/Throat:      Mouth: Mucous membranes are moist.      Pharynx: Oropharynx is clear.   Eyes:      Extraocular Movements: Extraocular movements intact.      Conjunctiva/sclera: Conjunctivae normal.      Pupils: Pupils are equal, round, and reactive to light.   Cardiovascular:      Rate and Rhythm: Normal rate and regular rhythm.      Heart sounds: Normal heart sounds. No murmur heard.  Pulmonary:      Effort:  Pulmonary effort is normal.      Breath sounds: Rhonchi (Coarse scattered rhonchi bilateral lung fields without wheeze) present. No wheezing.   Abdominal:      General: Abdomen is flat. Bowel sounds are normal.      Palpations: Abdomen is soft.   Musculoskeletal:         General: Normal range of motion.   Skin:     General: Skin is warm and dry.   Neurological:      General: No focal deficit present.      Mental Status: She is alert and oriented to person, place, and time. Mental status is at baseline.   Psychiatric:         Mood and Affect: Mood normal.         Behavior: Behavior normal.         Thought Content: Thought content normal.         Judgment: Judgment normal.         Assessment/Plan   Problem List Items Addressed This Visit    None  Visit Diagnoses       Acute bronchitis, unspecified organism    -  Primary    Relevant Medications    azithromycin (Zithromax) 250 mg tablet    benzonatate (Tessalon) 200 mg capsule        Use meds as directed.  Return to clinic if symptoms do not improve in 10-14 days.    Should the condition worsen contact me and return here or if after hours seek further evaluation at an urgent care or in the emergency room.  Medication list reconciled.  Thank you for visiting today!      Professional services: Some of this note was completed using Dragon voice technology and sometimes the software misinterprets words. This may include unintended errors with respect to translation of words, typographical errors or grammar errors which may not have been identified prior to finalization of the chart note. Please take this into account when reading the note. Thank you.

## 2023-07-26 ENCOUNTER — TELEPHONE (OUTPATIENT)
Dept: PRIMARY CARE | Facility: CLINIC | Age: 68
End: 2023-07-26
Payer: MEDICARE

## 2023-07-26 DIAGNOSIS — J20.9 ACUTE BRONCHITIS, UNSPECIFIED ORGANISM: Primary | ICD-10-CM

## 2023-07-26 RX ORDER — CEPHALEXIN 500 MG/1
500 CAPSULE ORAL 3 TIMES DAILY
Qty: 30 CAPSULE | Refills: 0 | Status: SHIPPED | OUTPATIENT
Start: 2023-07-26 | End: 2023-08-05

## 2023-07-26 NOTE — TELEPHONE ENCOUNTER
Patient is calling asking stating that she was in yesterday to see Dr. Gabriel and that she was given Azithromycin.  Patient states that when she went to go pick it up yesterday she was told that she can not take that because it will interact with her Tikosyn.  Patient can be reached at [phone]    Thank You

## 2023-08-04 DIAGNOSIS — J20.9 ACUTE BRONCHITIS, UNSPECIFIED ORGANISM: Primary | ICD-10-CM

## 2023-08-04 RX ORDER — CEFPROZIL 250 MG/1
250 TABLET, FILM COATED ORAL 2 TIMES DAILY
Qty: 20 TABLET | Refills: 0 | Status: SHIPPED | OUTPATIENT
Start: 2023-08-04 | End: 2023-08-14

## 2023-08-04 NOTE — TELEPHONE ENCOUNTER
[address] EMERGENCY DEPT  [address] [id number]  [phone]    Work/School Note    Date: 2/20/2023    To Whom It May concern:    Kevin Corbin was seen and treated today in the emergency room by the following provider(s):  Dr. Vince Yan. Kevin Corbin may return to work on 02/23/2023, or as directed by primary care.     Sincerely,          Yaneth Dawkins RN Please notify patient a new prescription for Cefzil was sent to her pharmacy.

## 2023-08-04 NOTE — TELEPHONE ENCOUNTER
Patient called today and stated that the Keflex is not agreeing with her stomach and is asking if something else can be sent into her pharmacy.  Patient can be reached at [phone].    Thank You

## 2024-01-09 ENCOUNTER — OFFICE VISIT (OUTPATIENT)
Dept: PRIMARY CARE | Facility: CLINIC | Age: 69
End: 2024-01-09
Payer: MEDICARE

## 2024-01-09 VITALS
RESPIRATION RATE: 20 BRPM | BODY MASS INDEX: 26.89 KG/M2 | HEART RATE: 64 BPM | TEMPERATURE: 97.8 F | WEIGHT: 147 LBS | DIASTOLIC BLOOD PRESSURE: 78 MMHG | SYSTOLIC BLOOD PRESSURE: 124 MMHG | OXYGEN SATURATION: 95 %

## 2024-01-09 DIAGNOSIS — I48.91 ATRIAL FIBRILLATION, UNSPECIFIED TYPE (MULTI): ICD-10-CM

## 2024-01-09 DIAGNOSIS — J44.9 CHRONIC OBSTRUCTIVE PULMONARY DISEASE, UNSPECIFIED COPD TYPE (MULTI): ICD-10-CM

## 2024-01-09 DIAGNOSIS — I50.32 CHRONIC HEART FAILURE WITH PRESERVED EJECTION FRACTION (MULTI): ICD-10-CM

## 2024-01-09 DIAGNOSIS — C34.90 MALIGNANT NEOPLASM OF LUNG, UNSPECIFIED LATERALITY, UNSPECIFIED PART OF LUNG (MULTI): ICD-10-CM

## 2024-01-09 DIAGNOSIS — J20.9 ACUTE BRONCHITIS, UNSPECIFIED ORGANISM: Primary | ICD-10-CM

## 2024-01-09 DIAGNOSIS — D69.6 THROMBOCYTOPENIA, UNSPECIFIED (CMS-HCC): ICD-10-CM

## 2024-01-09 DIAGNOSIS — I70.0 ATHEROSCLEROSIS OF AORTA (CMS-HCC): ICD-10-CM

## 2024-01-09 DIAGNOSIS — I25.810 CORONARY ARTERY DISEASE INVOLVING CORONARY BYPASS GRAFT OF NATIVE HEART, UNSPECIFIED WHETHER ANGINA PRESENT: ICD-10-CM

## 2024-01-09 DIAGNOSIS — N18.31 STAGE 3A CHRONIC KIDNEY DISEASE (MULTI): ICD-10-CM

## 2024-01-09 PROCEDURE — 99214 OFFICE O/P EST MOD 30 MIN: CPT | Performed by: FAMILY MEDICINE

## 2024-01-09 RX ORDER — CLINDAMYCIN HYDROCHLORIDE 300 MG/1
300 CAPSULE ORAL 2 TIMES DAILY
Qty: 20 CAPSULE | Refills: 0 | Status: SHIPPED | OUTPATIENT
Start: 2024-01-09 | End: 2024-01-19

## 2024-01-09 NOTE — PROGRESS NOTES
Subjective   Patient ID: Lit Mackay is a 68 y.o. female who presents for Cough (X 1 1/2 weeks. Sore throat, thick phlegm. On tikosyn and has not really been taking any OTC meds).    HPI  Patient comes in today complaining of cough and congestion for the past week and a half.  She states that her  has been sick as well as have her grandchildren whom she has been around.  Patient has had a productive cough with thick yellow phlegm along with sinus congestion and drainage, sore throat and occasional headache.  Last time she was here about 6 months ago she had similar complaints.  She is on Tikosyn now and has to be careful with antibiotics that may interact.  She was placed on clindamycin recently by her dentist which she did not have any problem with.    7/25/2023  Patient comes in today complaining of cough and congestion.  She states his symptoms began about 4 days ago.  They cough has been productive of yellow phlegm.  She has also been tired and fatigued.  She denies any exposure to COVID and claims no one else has been sick around her.    Review of Systems   All other systems reviewed and are negative.      Objective   /78   Pulse 64   Temp 36.6 °C (97.8 °F)   Resp 20   Wt 66.7 kg (147 lb)   LMP  (LMP Unknown)   SpO2 95%   BMI 26.89 kg/m²     Physical Exam  Constitutional:       Appearance: She is well-developed.   HENT:      Head: Normocephalic and atraumatic.      Right Ear: Tympanic membrane, ear canal and external ear normal.      Left Ear: Tympanic membrane, ear canal and external ear normal.      Nose: Nose normal.      Mouth/Throat:      Mouth: Mucous membranes are moist.      Pharynx: Oropharynx is clear.   Eyes:      Extraocular Movements: Extraocular movements intact.      Conjunctiva/sclera: Conjunctivae normal.      Pupils: Pupils are equal, round, and reactive to light.      Comments: Patient wears glasses   Cardiovascular:      Rate and Rhythm: Normal rate and regular rhythm.       Heart sounds: Normal heart sounds. No murmur heard.  Pulmonary:      Effort: Pulmonary effort is normal.      Breath sounds: Normal breath sounds. No wheezing.   Abdominal:      General: Abdomen is flat. Bowel sounds are normal.      Palpations: Abdomen is soft.   Musculoskeletal:         General: Normal range of motion.   Skin:     General: Skin is warm and dry.   Neurological:      General: No focal deficit present.      Mental Status: She is alert and oriented to person, place, and time. Mental status is at baseline.   Psychiatric:         Mood and Affect: Mood normal.         Behavior: Behavior normal.         Thought Content: Thought content normal.         Judgment: Judgment normal.         Assessment/Plan   Problem List Items Addressed This Visit             ICD-10-CM    COPD (chronic obstructive pulmonary disease) (CMS/[facility]) J44.9    Lung cancer (CMS/HCC) C34.90    Stage 3a chronic kidney disease (CMS/[facility]) N18.31    Coronary artery disease involving coronary bypass graft of native heart I25.810    Atrial fibrillation (CMS/[facility]) I48.91    Chronic heart failure with preserved ejection fraction (CMS/HCC) I50.32    Atherosclerosis of aorta (CMS/[facility]) I70.0    Thrombocytopenia, unspecified (CMS/[facility]) D69.6     Other Visit Diagnoses         Codes    Acute bronchitis, unspecified organism    -  Primary J20.9    Relevant Medications    clindamycin (Cleocin) 300 mg capsule        Use meds as directed.  Return to clinic if symptoms do not improve in 10-14 days.    Should the condition worsen contact me and return here or if after hours seek further evaluation at an urgent care or in the emergency room.  Continue current meds as directed.  Follow up in 6 months for recheck if all remains stable, sooner if problems arise.  Medication list reconciled.  Thank you for visiting today!      Professional services: Some of this note was completed using Dragon voice technology and sometimes the software misinterprets words. This  may include unintended errors with respect to translation of words, typographical errors or grammar errors which may not have been identified prior to finalization of the chart note. Please take this into account when reading the note. Thank you.

## 2024-01-10 PROBLEM — I50.32 CHRONIC HEART FAILURE WITH PRESERVED EJECTION FRACTION (MULTI): Status: ACTIVE | Noted: 2024-01-10

## 2024-01-10 PROBLEM — D69.6 THROMBOCYTOPENIA, UNSPECIFIED (CMS-HCC): Status: ACTIVE | Noted: 2024-01-10

## 2024-01-10 PROBLEM — I70.0 ATHEROSCLEROSIS OF AORTA (CMS-HCC): Status: ACTIVE | Noted: 2024-01-10

## 2024-02-23 ENCOUNTER — HOSPITAL ENCOUNTER (OUTPATIENT)
Dept: RADIOLOGY | Facility: CLINIC | Age: 69
Discharge: HOME | End: 2024-02-23
Payer: MEDICARE

## 2024-02-23 ENCOUNTER — OFFICE VISIT (OUTPATIENT)
Dept: PRIMARY CARE | Facility: CLINIC | Age: 69
End: 2024-02-23
Payer: MEDICARE

## 2024-02-23 VITALS
DIASTOLIC BLOOD PRESSURE: 71 MMHG | TEMPERATURE: 98 F | HEIGHT: 61 IN | SYSTOLIC BLOOD PRESSURE: 125 MMHG | BODY MASS INDEX: 27.87 KG/M2 | RESPIRATION RATE: 18 BRPM | HEART RATE: 68 BPM | WEIGHT: 147.6 LBS | OXYGEN SATURATION: 95 %

## 2024-02-23 DIAGNOSIS — J20.9 ACUTE BRONCHITIS, UNSPECIFIED ORGANISM: ICD-10-CM

## 2024-02-23 DIAGNOSIS — J20.9 ACUTE BRONCHITIS, UNSPECIFIED ORGANISM: Primary | ICD-10-CM

## 2024-02-23 PROCEDURE — 87807 RSV ASSAY W/OPTIC: CPT | Performed by: FAMILY MEDICINE

## 2024-02-23 PROCEDURE — 99214 OFFICE O/P EST MOD 30 MIN: CPT | Performed by: FAMILY MEDICINE

## 2024-02-23 PROCEDURE — 71046 X-RAY EXAM CHEST 2 VIEWS: CPT | Performed by: RADIOLOGY

## 2024-02-23 PROCEDURE — 71046 X-RAY EXAM CHEST 2 VIEWS: CPT

## 2024-02-23 PROCEDURE — 87804 INFLUENZA ASSAY W/OPTIC: CPT | Performed by: FAMILY MEDICINE

## 2024-02-23 PROCEDURE — 87811 SARS-COV-2 COVID19 W/OPTIC: CPT | Performed by: FAMILY MEDICINE

## 2024-02-23 RX ORDER — CEPHALEXIN 500 MG/1
500 CAPSULE ORAL 3 TIMES DAILY
Qty: 30 CAPSULE | Refills: 0 | Status: SHIPPED | OUTPATIENT
Start: 2024-02-23 | End: 2024-03-04

## 2024-02-23 NOTE — PROGRESS NOTES
Subjective   Patient ID: Lit Mackay is a 68 y.o. female who presents for URI (Patient was seen a month ago for URI. Patient stated the antibiotic that was prescribed, made patient nauseous so she did not complete the medication. Patient has not been tested for anything. ).    HPI   Patient returns today for follow-up on her URI.  When she was in last month we treated her with clindamycin however she tells me today she had nausea and vomiting with that and only took a few doses.  She states her  recently was hospitalized and is home now with similar symptoms.  She has not been checked for RSV, flu or COVID.  She is taking Tikosyn which along with all her other allergies and now her new intolerance of clindamycin really limits her antibiotic availability.  She tells me her dentist recently had her on Keflex which she tolerated.  Keflex is on her current allergy list but she does not know why.  She claims she tolerates it well.    1/9/2024  Patient comes in today complaining of cough and congestion for the past week and a half.  She states that her  has been sick as well as have her grandchildren whom she has been around.  Patient has had a productive cough with thick yellow phlegm along with sinus congestion and drainage, sore throat and occasional headache.  Last time she was here about 6 months ago she had similar complaints.  She is on Tikosyn now and has to be careful with antibiotics that may interact.  She was placed on clindamycin recently by her dentist which she did not have any problem with.    7/25/2023  Patient comes in today complaining of cough and congestion.  She states his symptoms began about 4 days ago.  They cough has been productive of yellow phlegm.  She has also been tired and fatigued.  She denies any exposure to COVID and claims no one else has been sick around her.    Review of Systems   All other systems reviewed and are negative.      Objective   /71   Pulse 68   Temp  "36.7 °C (98 °F)   Resp 18   Ht 1.549 m (5' 1\")   Wt 67 kg (147 lb 9.6 oz)   LMP  (LMP Unknown)   SpO2 95%   BMI 27.89 kg/m²     Physical Exam  Vitals reviewed.   Constitutional:       Appearance: She is well-developed.   HENT:      Head: Normocephalic and atraumatic.      Right Ear: Tympanic membrane, ear canal and external ear normal.      Left Ear: Tympanic membrane, ear canal and external ear normal.      Nose: Nose normal.      Mouth/Throat:      Mouth: Mucous membranes are moist.      Pharynx: Oropharynx is clear.   Eyes:      Extraocular Movements: Extraocular movements intact.      Conjunctiva/sclera: Conjunctivae normal.      Pupils: Pupils are equal, round, and reactive to light.   Cardiovascular:      Rate and Rhythm: Normal rate and regular rhythm.      Heart sounds: Normal heart sounds. No murmur heard.  Pulmonary:      Effort: Pulmonary effort is normal.      Breath sounds: Wheezing and rhonchi (Coarse scattered rhonchi bilateral lung fields with wheeze) present.   Abdominal:      General: Abdomen is flat. Bowel sounds are normal.      Palpations: Abdomen is soft.   Musculoskeletal:         General: Normal range of motion.   Skin:     General: Skin is warm and dry.   Neurological:      General: No focal deficit present.      Mental Status: She is alert and oriented to person, place, and time. Mental status is at baseline.   Psychiatric:         Mood and Affect: Mood normal.         Behavior: Behavior normal.         Thought Content: Thought content normal.         Judgment: Judgment normal.       Assessment/Plan   Problem List Items Addressed This Visit    None  Visit Diagnoses         Codes    Acute bronchitis, unspecified organism    -  Primary J20.9    Relevant Medications    cephalexin (Keflex) 500 mg capsule    Other Relevant Orders    XR chest 2 views        Use meds as directed.  Use Mucinex DM 1 every 12 hrs as needed for cough.  Return to clinic if symptoms do not improve in 10-14 days.  "   Should the condition worsen contact me and return here or if after hours seek further evaluation at an urgent care or in the emergency room.  Medication list reconciled.  Thank you for visiting today!      Professional services: Some of this note was completed using Dragon voice technology and sometimes the software misinterprets words. This may include unintended errors with respect to translation of words, typographical errors or grammar errors which may not have been identified prior to finalization of the chart note. Please take this into account when reading the note. Thank you.

## 2024-02-26 LAB
POC RAPID INFLUENZA A: NEGATIVE
POC RAPID INFLUENZA B: NEGATIVE
POC RSV RAPID ANTIGEN: NEGATIVE
POC SARS-COV-2 AG BINAX: NORMAL

## 2024-03-08 DIAGNOSIS — K21.9 GASTROESOPHAGEAL REFLUX DISEASE, UNSPECIFIED WHETHER ESOPHAGITIS PRESENT: Primary | ICD-10-CM

## 2024-03-08 NOTE — TELEPHONE ENCOUNTER
Rx Refill Request Telephone Encounter    Name:  Lit Mackay  :  [id number]  Medication Name:  Pantoprazole (ProtoNix) 20 mg EC tablet       Specific Pharmacy location:    [address] Phone: [phone]   Fax: [phone]        Date of last appointment:  24  Date of next appointment:    Best number to reach patient:  [phone]

## 2024-03-11 RX ORDER — PANTOPRAZOLE SODIUM 20 MG/1
40 TABLET, DELAYED RELEASE ORAL 2 TIMES DAILY
Qty: 360 TABLET | Refills: 1 | Status: SHIPPED | OUTPATIENT
Start: 2024-03-11 | End: 2024-09-07

## 2024-03-11 NOTE — TELEPHONE ENCOUNTER
Requested Prescriptions     Pending Prescriptions Disp Refills    pantoprazole (ProtoNix) 20 mg EC tablet       Sig: Take 2 tablets (40 mg) by mouth 2 times a day. Do not crush, chew, or split.

## 2024-03-26 ENCOUNTER — OFFICE VISIT (OUTPATIENT)
Dept: PRIMARY CARE | Facility: CLINIC | Age: 69
End: 2024-03-26
Payer: MEDICARE

## 2024-03-26 VITALS
OXYGEN SATURATION: 93 % | DIASTOLIC BLOOD PRESSURE: 74 MMHG | RESPIRATION RATE: 16 BRPM | BODY MASS INDEX: 27.59 KG/M2 | WEIGHT: 146 LBS | SYSTOLIC BLOOD PRESSURE: 127 MMHG | TEMPERATURE: 97.5 F | HEART RATE: 63 BPM

## 2024-03-26 DIAGNOSIS — N39.0 URINARY TRACT INFECTION WITH HEMATURIA, SITE UNSPECIFIED: Primary | ICD-10-CM

## 2024-03-26 DIAGNOSIS — R31.9 URINARY TRACT INFECTION WITH HEMATURIA, SITE UNSPECIFIED: Primary | ICD-10-CM

## 2024-03-26 LAB
POC APPEARANCE, URINE: ABNORMAL
POC BILIRUBIN, URINE: NEGATIVE
POC BLOOD, URINE: ABNORMAL
POC COLOR, URINE: ABNORMAL
POC GLUCOSE, URINE: NEGATIVE MG/DL
POC KETONES, URINE: NEGATIVE MG/DL
POC LEUKOCYTES, URINE: ABNORMAL
POC NITRITE,URINE: NEGATIVE
POC PH, URINE: 6.5 PH
POC PROTEIN, URINE: ABNORMAL MG/DL
POC SPECIFIC GRAVITY, URINE: 1.02
POC UROBILINOGEN, URINE: 0.2 EU/DL

## 2024-03-26 PROCEDURE — 99213 OFFICE O/P EST LOW 20 MIN: CPT | Performed by: FAMILY MEDICINE

## 2024-03-26 PROCEDURE — 81003 URINALYSIS AUTO W/O SCOPE: CPT | Performed by: FAMILY MEDICINE

## 2024-03-26 PROCEDURE — 87186 SC STD MICRODIL/AGAR DIL: CPT

## 2024-03-26 PROCEDURE — 87086 URINE CULTURE/COLONY COUNT: CPT

## 2024-03-26 RX ORDER — CEPHALEXIN 500 MG/1
500 CAPSULE ORAL 3 TIMES DAILY
Qty: 30 CAPSULE | Refills: 0 | Status: SHIPPED | OUTPATIENT
Start: 2024-03-26 | End: 2024-04-05

## 2024-03-26 NOTE — PROGRESS NOTES
Subjective   Patient ID: Lit Mackay is a 68 y.o. female who presents for UTI (Burning, frequency, and trouble fully emptying her bladder.  ) and Immunizations (Wants to get her flu shot. ).  HPI  Patient comes in today complaining of urinary tract symptoms.  She is having urinary frequency, urgency and burning.  She denies flank pain or other symptoms.    Review of Systems   All other systems reviewed and are negative.      Objective   Vitals:  /74   Pulse 63   Temp 36.4 °C (97.5 °F)   Resp 16   Wt 66.2 kg (146 lb)   LMP  (LMP Unknown)   SpO2 93%   BMI 27.59 kg/m²     Physical Exam  Vitals reviewed.   Constitutional:       Appearance: She is well-developed.   HENT:      Head: Normocephalic and atraumatic.      Right Ear: Tympanic membrane, ear canal and external ear normal.      Left Ear: Tympanic membrane, ear canal and external ear normal.      Nose: Nose normal.      Mouth/Throat:      Mouth: Mucous membranes are moist.      Pharynx: Oropharynx is clear.   Eyes:      Extraocular Movements: Extraocular movements intact.      Conjunctiva/sclera: Conjunctivae normal.      Pupils: Pupils are equal, round, and reactive to light.   Cardiovascular:      Rate and Rhythm: Normal rate and regular rhythm.      Heart sounds: Normal heart sounds. No murmur heard.  Pulmonary:      Effort: Pulmonary effort is normal.      Breath sounds: Normal breath sounds. No wheezing.   Abdominal:      General: Abdomen is flat. Bowel sounds are normal.      Palpations: Abdomen is soft.      Tenderness: There is abdominal tenderness (Positive suprapubic tenderness to palpation, no flank tenderness.).   Musculoskeletal:         General: Normal range of motion.   Skin:     General: Skin is warm and dry.   Neurological:      General: No focal deficit present.      Mental Status: She is alert and oriented to person, place, and time. Mental status is at baseline.   Psychiatric:         Mood and Affect: Mood normal.         Behavior:  Behavior normal.         Thought Content: Thought content normal.         Judgment: Judgment normal.       Assessment/Plan   Problem List Items Addressed This Visit    None  Visit Diagnoses       Urinary tract infection with hematuria, site unspecified    -  Primary    Relevant Medications    cephalexin (Keflex) 500 mg capsule    Other Relevant Orders    POCT UA Automated manually resulted (Completed)    Urine Culture (Completed)        Patient encouraged to drink lots of fluids and cranberry juice.  Will notify patient of urine culture results when available.  Use meds as directed.  Return to clinic if symptoms do not improve in 7-10 days, sooner if condition worsens.  Medication list reconciled.  Thank you for visiting today!      Professional services: Some of this note was completed using Dragon voice technology and sometimes the software misinterprets words. This may include unintended errors with respect to translation of words, typographical errors or grammar errors which may not have been identified prior to finalization of the chart note. Please take this into account when reading the note. Thank you.    Rob Gabriel,  03/30/24 12:10 PM

## 2024-03-29 ENCOUNTER — TELEPHONE (OUTPATIENT)
Dept: PRIMARY CARE | Facility: CLINIC | Age: 69
End: 2024-03-29
Payer: MEDICARE

## 2024-03-29 NOTE — TELEPHONE ENCOUNTER
----- Message from Rob Gabriel DO sent at 3/29/2024  7:14 AM EDT -----  Please notify patient of positive urine culture.  Finish the antibiotics and return to clinic if symptoms not improved

## 2024-03-29 NOTE — RESULT ENCOUNTER NOTE
Please notify patient of positive urine culture.  Finish the antibiotics and return to clinic if symptoms not improved

## 2024-03-30 LAB — BACTERIA UR CULT: ABNORMAL

## 2024-04-23 ENCOUNTER — APPOINTMENT (OUTPATIENT)
Dept: PRIMARY CARE | Facility: CLINIC | Age: 69
End: 2024-04-23
Payer: MEDICARE

## 2024-09-28 DIAGNOSIS — F32.A DEPRESSION, UNSPECIFIED DEPRESSION TYPE: ICD-10-CM

## 2024-09-30 RX ORDER — SERTRALINE HYDROCHLORIDE 100 MG/1
200 TABLET, FILM COATED ORAL NIGHTLY
Qty: 60 TABLET | Refills: 0 | Status: SHIPPED | OUTPATIENT
Start: 2024-09-30 | End: 2024-10-30

## 2024-09-30 NOTE — TELEPHONE ENCOUNTER
Pt last OV 3/26/2024  Requested Prescriptions     Pending Prescriptions Disp Refills    sertraline (Zoloft) 100 mg tablet [Pharmacy Med Name: Sertraline HCl 100 MG Oral Tablet] 60 tablet 0     Sig: Take 2 tablets (200 mg) by mouth once daily at bedtime.

## 2024-10-07 ENCOUNTER — APPOINTMENT (OUTPATIENT)
Dept: PRIMARY CARE | Facility: CLINIC | Age: 69
End: 2024-10-07
Payer: MEDICARE

## 2024-10-07 ENCOUNTER — TELEPHONE (OUTPATIENT)
Dept: PRIMARY CARE | Facility: CLINIC | Age: 69
End: 2024-10-07

## 2024-10-07 VITALS
TEMPERATURE: 97.8 F | DIASTOLIC BLOOD PRESSURE: 84 MMHG | SYSTOLIC BLOOD PRESSURE: 123 MMHG | RESPIRATION RATE: 16 BRPM | HEART RATE: 69 BPM | OXYGEN SATURATION: 95 % | WEIGHT: 151 LBS | BODY MASS INDEX: 28.53 KG/M2

## 2024-10-07 DIAGNOSIS — J20.9 ACUTE BRONCHITIS, UNSPECIFIED ORGANISM: Primary | ICD-10-CM

## 2024-10-07 DIAGNOSIS — Z12.31 ENCOUNTER FOR SCREENING MAMMOGRAM FOR BREAST CANCER: ICD-10-CM

## 2024-10-07 LAB
POC RAPID INFLUENZA A: NEGATIVE
POC RAPID INFLUENZA B: NEGATIVE
POC SARS-COV-2 AG BINAX: NORMAL

## 2024-10-07 PROCEDURE — 87811 SARS-COV-2 COVID19 W/OPTIC: CPT | Performed by: FAMILY MEDICINE

## 2024-10-07 PROCEDURE — 99213 OFFICE O/P EST LOW 20 MIN: CPT | Performed by: FAMILY MEDICINE

## 2024-10-07 PROCEDURE — 87804 INFLUENZA ASSAY W/OPTIC: CPT | Performed by: FAMILY MEDICINE

## 2024-10-07 RX ORDER — CLINDAMYCIN HYDROCHLORIDE 300 MG/1
300 CAPSULE ORAL 2 TIMES DAILY
Qty: 20 CAPSULE | Refills: 0 | Status: SHIPPED | OUTPATIENT
Start: 2024-10-07 | End: 2024-10-17

## 2024-10-07 ASSESSMENT — PATIENT HEALTH QUESTIONNAIRE - PHQ9
1. LITTLE INTEREST OR PLEASURE IN DOING THINGS: NOT AT ALL
2. FEELING DOWN, DEPRESSED OR HOPELESS: NOT AT ALL
SUM OF ALL RESPONSES TO PHQ9 QUESTIONS 1 AND 2: 0

## 2024-10-07 ASSESSMENT — ENCOUNTER SYMPTOMS
OCCASIONAL FEELINGS OF UNSTEADINESS: 0
DEPRESSION: 0
LOSS OF SENSATION IN FEET: 0

## 2024-10-07 NOTE — TELEPHONE ENCOUNTER
Please call patient and make sure that it is indeed clindamycin.  Clindamycin was on her allergy list causing nausea and vomiting.  I did take it off of her list and sent a prescription to her [facility] pharmacy if she is sure that she can take it.

## 2024-10-07 NOTE — PROGRESS NOTES
Subjective   Patient ID: Lit Mackay is a 69 y.o. female who presents for Cough (That started Wednesday with sore throat. Fatigue. Drainage and congestion. SOB. Blowing her nose continuously. Did not Covid test.  got sick first last Monday. /She has congestive hear failure and when she coughs she gets a twinge in her chest. ).    HPI  Patient comes in today with a course cough that she has had since last Wednesday.  She states her  began with a cough on Monday prior.  She states it is a productive cough of thick yellow gray-colored mucus.  She has a difficult time sometimes coughing it up.  She denies fever, sinus drainage or sore throat.  She denies headache or fever.  She has a multitude of antibiotic allergies and also takes Tikosyn which disallows several antibiotics use.    Review of Systems   All other systems reviewed and are negative.      Objective   Vitals:  /84   Pulse 69   Temp 36.6 °C (97.8 °F)   Resp 16   Wt 68.5 kg (151 lb)   LMP  (LMP Unknown)   SpO2 95%   BMI 28.53 kg/m²     Physical Exam  Vitals reviewed.   Constitutional:       Appearance: She is well-developed.   HENT:      Head: Normocephalic and atraumatic.      Right Ear: Tympanic membrane, ear canal and external ear normal.      Left Ear: Tympanic membrane, ear canal and external ear normal.      Nose: Nose normal.      Mouth/Throat:      Mouth: Mucous membranes are moist.      Pharynx: Oropharynx is clear.   Eyes:      Extraocular Movements: Extraocular movements intact.      Conjunctiva/sclera: Conjunctivae normal.      Pupils: Pupils are equal, round, and reactive to light.   Cardiovascular:      Rate and Rhythm: Normal rate and regular rhythm.      Heart sounds: Normal heart sounds. No murmur heard.  Pulmonary:      Effort: Pulmonary effort is normal.      Breath sounds: Rhonchi (Coarse scattered rhonchi bilateral lung fields without wheeze) present. No wheezing.   Abdominal:      General: Abdomen is flat. Bowel  sounds are normal.      Palpations: Abdomen is soft.   Musculoskeletal:         General: Normal range of motion.   Skin:     General: Skin is warm and dry.   Neurological:      General: No focal deficit present.      Mental Status: She is alert and oriented to person, place, and time. Mental status is at baseline.   Psychiatric:         Mood and Affect: Mood normal.         Behavior: Behavior normal.         Thought Content: Thought content normal.         Judgment: Judgment normal.         Assessment/Plan   Problem List Items Addressed This Visit    None  Visit Diagnoses       Acute bronchitis, unspecified organism    -  Primary    Relevant Orders    POCT BinaxNOW Covid-19 Ag Card manually resulted (Completed)    POCT Influenza A/B manually resulted (Completed)    Encounter for screening mammogram for breast cancer        Relevant Orders    BI mammo bilateral screening tomosynthesis (Completed)        Use Mucinex DM 1 every 12 hrs as needed for cough  Use meds as directed.  Return to clinic if symptoms do not improve in 10-14 days.    Should the condition worsen contact me and return here or if after hours seek further evaluation at an urgent care or in the emergency room.       Rob Gabriel DO 12/23/24 7:40 AM

## 2024-10-07 NOTE — TELEPHONE ENCOUNTER
Pt states PCP had asked at visit today what ATB she had taken in the past, pt states it was clindamycin.

## 2024-10-08 NOTE — TELEPHONE ENCOUNTER
Patient called in this afternoon stating that she did start taking the medication that was sent to the pharmacy and she is doing okay and not having any issues.  Patient states that she will call and keep us informed if anything should change.        Thank You

## 2024-10-19 ENCOUNTER — HOSPITAL ENCOUNTER (OUTPATIENT)
Dept: RADIOLOGY | Facility: CLINIC | Age: 69
Discharge: HOME | End: 2024-10-19
Payer: MEDICARE

## 2024-10-19 VITALS — WEIGHT: 151.01 LBS | BODY MASS INDEX: 28.51 KG/M2 | HEIGHT: 61 IN

## 2024-10-19 DIAGNOSIS — Z12.31 ENCOUNTER FOR SCREENING MAMMOGRAM FOR BREAST CANCER: ICD-10-CM

## 2024-10-19 PROCEDURE — 77067 SCR MAMMO BI INCL CAD: CPT | Performed by: RADIOLOGY

## 2024-10-19 PROCEDURE — 77067 SCR MAMMO BI INCL CAD: CPT

## 2024-10-19 PROCEDURE — 77063 BREAST TOMOSYNTHESIS BI: CPT | Performed by: RADIOLOGY

## 2024-10-22 ENCOUNTER — TELEPHONE (OUTPATIENT)
Dept: PRIMARY CARE | Facility: CLINIC | Age: 69
End: 2024-10-22
Payer: MEDICARE

## 2024-10-22 DIAGNOSIS — J20.9 ACUTE BRONCHITIS, UNSPECIFIED ORGANISM: ICD-10-CM

## 2024-10-22 RX ORDER — CLINDAMYCIN HYDROCHLORIDE 300 MG/1
300 CAPSULE ORAL 2 TIMES DAILY
Qty: 20 CAPSULE | Refills: 0 | Status: SHIPPED | OUTPATIENT
Start: 2024-10-22 | End: 2024-11-01

## 2024-10-22 NOTE — TELEPHONE ENCOUNTER
Pt called and and stated she finished the antibiotics but still has the thick mucus that gags her when she coughs. Please advise

## 2024-10-22 NOTE — TELEPHONE ENCOUNTER
Because of her multiple antibiotic allergies we are going to give her another 10 days of the clindamycin.  She should also use Mucinex DM 1 every 12 hours to thin out the secretions and help with her cough.

## 2024-10-25 NOTE — RESULT ENCOUNTER NOTE
Davon Murrieta,    Your mammograms were negative.  Recheck in 1 year.    Have a Great Day!!!    Dr. Gabriel

## 2024-11-02 DIAGNOSIS — F32.A DEPRESSION, UNSPECIFIED DEPRESSION TYPE: ICD-10-CM

## 2024-11-04 RX ORDER — SERTRALINE HYDROCHLORIDE 100 MG/1
200 TABLET, FILM COATED ORAL NIGHTLY
Qty: 180 TABLET | Refills: 1 | Status: SHIPPED | OUTPATIENT
Start: 2024-11-04 | End: 2025-05-03

## 2024-11-04 NOTE — TELEPHONE ENCOUNTER
Pt last OV 10/7/2024  Requested Prescriptions     Pending Prescriptions Disp Refills    sertraline (Zoloft) 100 mg tablet [Pharmacy Med Name: Sertraline HCl 100 MG Oral Tablet] 180 tablet 1     Sig: Take 2 tablets (200 mg) by mouth once daily at bedtime.

## 2024-11-30 DIAGNOSIS — K21.9 GASTROESOPHAGEAL REFLUX DISEASE, UNSPECIFIED WHETHER ESOPHAGITIS PRESENT: ICD-10-CM

## 2024-12-02 RX ORDER — PANTOPRAZOLE SODIUM 20 MG/1
40 TABLET, DELAYED RELEASE ORAL
Qty: 180 TABLET | Refills: 1 | Status: SHIPPED | OUTPATIENT
Start: 2024-12-02 | End: 2025-05-31

## 2024-12-02 NOTE — TELEPHONE ENCOUNTER
Pt last OV 10/7/2024    Requested Prescriptions     Pending Prescriptions Disp Refills    pantoprazole (ProtoNix) 20 mg EC tablet [Pharmacy Med Name: Pantoprazole Sodium 20 MG Oral Tablet Delayed Release] 180 tablet 1     Sig: Take 2 tablets (40 mg) by mouth once daily in the morning. Take before meals.

## 2025-02-25 ENCOUNTER — OFFICE VISIT (OUTPATIENT)
Dept: PRIMARY CARE | Facility: CLINIC | Age: 70
End: 2025-02-25
Payer: MEDICARE

## 2025-02-25 VITALS
BODY MASS INDEX: 28.36 KG/M2 | SYSTOLIC BLOOD PRESSURE: 108 MMHG | HEART RATE: 72 BPM | WEIGHT: 150 LBS | OXYGEN SATURATION: 94 % | RESPIRATION RATE: 16 BRPM | DIASTOLIC BLOOD PRESSURE: 72 MMHG | TEMPERATURE: 97.2 F

## 2025-02-25 DIAGNOSIS — N18.31 STAGE 3A CHRONIC KIDNEY DISEASE (MULTI): ICD-10-CM

## 2025-02-25 DIAGNOSIS — I48.91 ATRIAL FIBRILLATION, UNSPECIFIED TYPE (MULTI): ICD-10-CM

## 2025-02-25 DIAGNOSIS — I50.9 CHRONIC CONGESTIVE HEART FAILURE, UNSPECIFIED HEART FAILURE TYPE: ICD-10-CM

## 2025-02-25 DIAGNOSIS — C34.90 MALIGNANT NEOPLASM OF LUNG, UNSPECIFIED LATERALITY, UNSPECIFIED PART OF LUNG (MULTI): ICD-10-CM

## 2025-02-25 DIAGNOSIS — I25.810 CORONARY ARTERY DISEASE INVOLVING CORONARY BYPASS GRAFT OF NATIVE HEART, UNSPECIFIED WHETHER ANGINA PRESENT: ICD-10-CM

## 2025-02-25 DIAGNOSIS — J44.1 CHRONIC OBSTRUCTIVE PULMONARY DISEASE WITH (ACUTE) EXACERBATION (MULTI): ICD-10-CM

## 2025-02-25 DIAGNOSIS — J20.9 ACUTE BRONCHITIS, UNSPECIFIED ORGANISM: Primary | ICD-10-CM

## 2025-02-25 PROCEDURE — 99214 OFFICE O/P EST MOD 30 MIN: CPT | Performed by: FAMILY MEDICINE

## 2025-02-25 RX ORDER — CLINDAMYCIN HYDROCHLORIDE 150 MG/1
150 CAPSULE ORAL 4 TIMES DAILY
Qty: 40 CAPSULE | Refills: 0 | Status: SHIPPED | OUTPATIENT
Start: 2025-02-25 | End: 2025-03-07

## 2025-02-25 RX ORDER — PREDNISONE 10 MG/1
TABLET ORAL
Qty: 30 TABLET | Refills: 0 | Status: SHIPPED | OUTPATIENT
Start: 2025-02-25

## 2025-02-25 ASSESSMENT — ENCOUNTER SYMPTOMS
OCCASIONAL FEELINGS OF UNSTEADINESS: 0
LOSS OF SENSATION IN FEET: 0
DEPRESSION: 0

## 2025-02-25 ASSESSMENT — PATIENT HEALTH QUESTIONNAIRE - PHQ9
SUM OF ALL RESPONSES TO PHQ9 QUESTIONS 1 AND 2: 0
SUM OF ALL RESPONSES TO PHQ9 QUESTIONS 1 AND 2: 0
2. FEELING DOWN, DEPRESSED OR HOPELESS: NOT AT ALL
2. FEELING DOWN, DEPRESSED OR HOPELESS: NOT AT ALL
1. LITTLE INTEREST OR PLEASURE IN DOING THINGS: NOT AT ALL
1. LITTLE INTEREST OR PLEASURE IN DOING THINGS: NOT AT ALL

## 2025-05-01 DIAGNOSIS — F32.A DEPRESSION, UNSPECIFIED DEPRESSION TYPE: ICD-10-CM

## 2025-05-01 RX ORDER — SERTRALINE HYDROCHLORIDE 100 MG/1
200 TABLET, FILM COATED ORAL NIGHTLY
Qty: 180 TABLET | Refills: 0 | Status: SHIPPED | OUTPATIENT
Start: 2025-05-01 | End: 2025-07-30

## 2025-05-01 NOTE — TELEPHONE ENCOUNTER
Pt last OV 2/25/2025  Next OV Visit date not found    Requested Prescriptions     Pending Prescriptions Disp Refills    sertraline (Zoloft) 100 mg tablet [Pharmacy Med Name: Sertraline HCl 100 MG Oral Tablet] 180 tablet 0     Sig: Take 2 tablets (200 mg) by mouth once daily at bedtime.

## 2025-05-05 ENCOUNTER — TELEPHONE (OUTPATIENT)
Dept: GASTROENTEROLOGY | Facility: CLINIC | Age: 70
End: 2025-05-05

## 2025-05-05 ENCOUNTER — APPOINTMENT (OUTPATIENT)
Dept: PRIMARY CARE | Facility: CLINIC | Age: 70
End: 2025-05-05
Payer: MEDICARE

## 2025-05-05 VITALS
DIASTOLIC BLOOD PRESSURE: 74 MMHG | WEIGHT: 153 LBS | SYSTOLIC BLOOD PRESSURE: 115 MMHG | TEMPERATURE: 98.4 F | HEART RATE: 64 BPM | RESPIRATION RATE: 16 BRPM | OXYGEN SATURATION: 98 % | BODY MASS INDEX: 28.92 KG/M2

## 2025-05-05 DIAGNOSIS — F32.A DEPRESSION, UNSPECIFIED DEPRESSION TYPE: ICD-10-CM

## 2025-05-05 DIAGNOSIS — E55.9 VITAMIN D DEFICIENCY: ICD-10-CM

## 2025-05-05 DIAGNOSIS — F43.9 STRESS AT HOME: ICD-10-CM

## 2025-05-05 DIAGNOSIS — M85.80 OSTEOPENIA, UNSPECIFIED LOCATION: ICD-10-CM

## 2025-05-05 DIAGNOSIS — J44.9 CHRONIC OBSTRUCTIVE PULMONARY DISEASE, UNSPECIFIED COPD TYPE (MULTI): ICD-10-CM

## 2025-05-05 DIAGNOSIS — Z12.31 ENCOUNTER FOR SCREENING MAMMOGRAM FOR BREAST CANCER: ICD-10-CM

## 2025-05-05 DIAGNOSIS — C34.90 MALIGNANT NEOPLASM OF LUNG, UNSPECIFIED LATERALITY, UNSPECIFIED PART OF LUNG (MULTI): ICD-10-CM

## 2025-05-05 DIAGNOSIS — E53.8 VITAMIN B12 DEFICIENCY: ICD-10-CM

## 2025-05-05 DIAGNOSIS — Z13.228 SCREENING FOR METABOLIC DISORDER: ICD-10-CM

## 2025-05-05 DIAGNOSIS — Z00.00 ROUTINE GENERAL MEDICAL EXAMINATION AT A HEALTH CARE FACILITY: Primary | ICD-10-CM

## 2025-05-05 DIAGNOSIS — I25.810 CORONARY ARTERY DISEASE INVOLVING CORONARY BYPASS GRAFT OF NATIVE HEART, UNSPECIFIED WHETHER ANGINA PRESENT: ICD-10-CM

## 2025-05-05 DIAGNOSIS — I48.91 ATRIAL FIBRILLATION, UNSPECIFIED TYPE (MULTI): ICD-10-CM

## 2025-05-05 DIAGNOSIS — D50.9 IRON DEFICIENCY ANEMIA, UNSPECIFIED IRON DEFICIENCY ANEMIA TYPE: ICD-10-CM

## 2025-05-05 DIAGNOSIS — Z78.0 POST-MENOPAUSAL: ICD-10-CM

## 2025-05-05 DIAGNOSIS — E78.2 HYPERLIPIDEMIA, MIXED: ICD-10-CM

## 2025-05-05 PROCEDURE — 99214 OFFICE O/P EST MOD 30 MIN: CPT | Performed by: FAMILY MEDICINE

## 2025-05-05 PROCEDURE — G0439 PPPS, SUBSEQ VISIT: HCPCS | Performed by: FAMILY MEDICINE

## 2025-05-05 ASSESSMENT — PATIENT HEALTH QUESTIONNAIRE - PHQ9
SUM OF ALL RESPONSES TO PHQ9 QUESTIONS 1 AND 2: 3
6. FEELING BAD ABOUT YOURSELF - OR THAT YOU ARE A FAILURE OR HAVE LET YOURSELF OR YOUR FAMILY DOWN: SEVERAL DAYS
8. MOVING OR SPEAKING SO SLOWLY THAT OTHER PEOPLE COULD HAVE NOTICED. OR THE OPPOSITE, BEING SO FIGETY OR RESTLESS THAT YOU HAVE BEEN MOVING AROUND A LOT MORE THAN USUAL: NOT AT ALL
7. TROUBLE CONCENTRATING ON THINGS, SUCH AS READING THE NEWSPAPER OR WATCHING TELEVISION: NOT AT ALL
3. TROUBLE FALLING OR STAYING ASLEEP OR SLEEPING TOO MUCH: SEVERAL DAYS
10. IF YOU CHECKED OFF ANY PROBLEMS, HOW DIFFICULT HAVE THESE PROBLEMS MADE IT FOR YOU TO DO YOUR WORK, TAKE CARE OF THINGS AT HOME, OR GET ALONG WITH OTHER PEOPLE: SOMEWHAT DIFFICULT
2. FEELING DOWN, DEPRESSED OR HOPELESS: MORE THAN HALF THE DAYS
5. POOR APPETITE OR OVEREATING: SEVERAL DAYS
1. LITTLE INTEREST OR PLEASURE IN DOING THINGS: SEVERAL DAYS
9. THOUGHTS THAT YOU WOULD BE BETTER OFF DEAD, OR OF HURTING YOURSELF: NOT AT ALL
4. FEELING TIRED OR HAVING LITTLE ENERGY: NEARLY EVERY DAY
SUM OF ALL RESPONSES TO PHQ QUESTIONS 1-9: 9

## 2025-05-05 ASSESSMENT — ACTIVITIES OF DAILY LIVING (ADL)
DRESSING: INDEPENDENT
GROCERY_SHOPPING: INDEPENDENT
MANAGING_FINANCES: INDEPENDENT
DOING_HOUSEWORK: INDEPENDENT
TAKING_MEDICATION: INDEPENDENT
BATHING: INDEPENDENT

## 2025-05-05 ASSESSMENT — ENCOUNTER SYMPTOMS
OCCASIONAL FEELINGS OF UNSTEADINESS: 0
LOSS OF SENSATION IN FEET: 0
DEPRESSION: 0

## 2025-05-05 NOTE — TELEPHONE ENCOUNTER
Left message for patient to return call to office to schedule referral for iron deficiency anemia; left number for patient to use to schedule.

## 2025-05-05 NOTE — PROGRESS NOTES
Subjective   Reason for Visit: Lit Mackay is an 69 y.o. female here for a Medicare Wellness visit.               HPI  Patient comes in today for Medicare wellness exam.  She is currently without complaints.  She states she is taking all her medicines as directed without side effects.    She is up-to-date with DEXA and mammograms due in October of this year.    She last had a colonoscopy 7/29/2021 recheck 3 years with Dr. Frances Caicedo is her cardiologist and Dr. Sutton is her pulmonologist at [facility]    Patient's PHQ-9 score is 11 which she describes as somewhat difficult due to a lot of family drama.  Osteopenia    Patient Care Team:  Rob Gabriel DO as PCP - General  Rob Gabriel DO as PCP - [facility]ributed Provider     Review of Systems   All other systems reviewed and are negative.    Objective   Vitals:  /74   Pulse 64   Temp 36.9 °C (98.4 °F)   Resp 16   Wt 69.4 kg (153 lb)   LMP  (LMP Unknown)   SpO2 98%   BMI 28.92 kg/m²       Physical Exam  Vitals reviewed.   Constitutional:       Appearance: She is well-developed.   HENT:      Head: Normocephalic and atraumatic.      Right Ear: Tympanic membrane, ear canal and external ear normal.      Left Ear: Tympanic membrane, ear canal and external ear normal.      Nose: Nose normal.      Mouth/Throat:      Mouth: Mucous membranes are moist.      Pharynx: Oropharynx is clear.   Eyes:      Extraocular Movements: Extraocular movements intact.      Conjunctiva/sclera: Conjunctivae normal.      Pupils: Pupils are equal, round, and reactive to light.      Comments: Patient wears glasses   Cardiovascular:      Rate and Rhythm: Normal rate and regular rhythm.      Heart sounds: Normal heart sounds. No murmur heard.  Pulmonary:      Effort: Pulmonary effort is normal.   Abdominal:      General: Abdomen is flat. Bowel sounds are normal.      Palpations: Abdomen is soft.   Musculoskeletal:         General: Normal range of motion.    Skin:     General: Skin is warm and dry.   Neurological:      General: No focal deficit present.      Mental Status: She is alert and oriented to person, place, and time. Mental status is at baseline.   Psychiatric:         Mood and Affect: Mood normal.         Behavior: Behavior normal.         Thought Content: Thought content normal.         Judgment: Judgment normal.       Assessment & Plan  Encounter for screening mammogram for breast cancer    Orders:    BI mammo bilateral screening tomosynthesis; Future    Post-menopausal    Orders:    XR DEXA bone density; Future    Vitamin B12 deficiency    Orders:    CBC; Future    Vitamin B12; Future    Screening for metabolic disorder    Orders:    Comprehensive Metabolic Panel; Future    Hyperlipidemia, mixed    Orders:    Lipid Panel; Future    Vitamin D deficiency    Orders:    Vitamin D 25-Hydroxy,Total (for eval of Vitamin D levels); Future    Iron deficiency anemia, unspecified iron deficiency anemia type    Orders:    Referral to Gastroenterology; Future    Iron and TIBC; Future    Ferritin; Future    Atrial fibrillation, unspecified type (Multi)         Coronary artery disease involving coronary bypass graft of native heart, unspecified whether angina present         Malignant neoplasm of lung, unspecified laterality, unspecified part of lung (Multi)         Routine general medical examination at a health care facility         Depression, unspecified depression type         Stress at home         Chronic obstructive pulmonary disease, unspecified COPD type (Multi)         Osteopenia, unspecified location         Will contact patient with lab results when available.  Will contact patient with mammogram results when available.  Will contact patient with DEXA results when available.  Colonoscopy ordered  Medication list reconciled.  Thank you for visiting today!      Professional services: Some of this note was completed using Dragon voice technology and sometimes  the software misinterprets words. This may include unintended errors with respect to translation of words, typographical errors or grammar errors which may not have been identified prior to finalization of the chart note. Please take this into account when reading the note. Thank you.

## 2025-05-06 PROBLEM — Z78.0 POST-MENOPAUSAL: Status: ACTIVE | Noted: 2025-05-06

## 2025-05-06 PROBLEM — D50.9 IRON DEFICIENCY ANEMIA: Status: ACTIVE | Noted: 2025-05-06

## 2025-05-06 PROBLEM — Z12.31 ENCOUNTER FOR SCREENING MAMMOGRAM FOR BREAST CANCER: Status: ACTIVE | Noted: 2025-05-06

## 2025-05-06 PROBLEM — M85.80 OSTEOPENIA: Status: ACTIVE | Noted: 2025-05-06

## 2025-05-06 PROBLEM — E78.2 HYPERLIPIDEMIA, MIXED: Status: ACTIVE | Noted: 2023-04-20

## 2025-05-06 PROBLEM — E53.8 VITAMIN B12 DEFICIENCY: Status: ACTIVE | Noted: 2025-05-06

## 2025-05-13 ENCOUNTER — TELEPHONE (OUTPATIENT)
Dept: PRIMARY CARE | Facility: CLINIC | Age: 70
End: 2025-05-13
Payer: MEDICARE

## 2025-05-13 DIAGNOSIS — F32.A DEPRESSION, UNSPECIFIED DEPRESSION TYPE: Primary | ICD-10-CM

## 2025-05-13 NOTE — TELEPHONE ENCOUNTER
Pt calling to report that the sertraline makes her feel unwell. She talked with a friend and the same thing happened to them and they are now on Wellbutrin without side effects. She asks if she can try the same thing or a different medication. Please advise.

## 2025-05-14 RX ORDER — SERTRALINE HYDROCHLORIDE 50 MG/1
TABLET, FILM COATED ORAL
Qty: 30 TABLET | Refills: 0 | Status: SHIPPED | OUTPATIENT
Start: 2025-05-14

## 2025-05-14 NOTE — TELEPHONE ENCOUNTER
I spoke with patient this evening and we are going to wean her off of the sertraline by decreasing the amount 50 mg every 5 days.  A new prescription was sent to her [facility] pharmacy.  She is to follow-up here in 2 weeks.

## 2025-05-15 PROBLEM — R63.0 ANOREXIA: Status: ACTIVE | Noted: 2025-05-15

## 2025-05-15 PROBLEM — G47.9 SLEEP DIFFICULTIES: Status: ACTIVE | Noted: 2025-05-15

## 2025-05-15 PROBLEM — Z95.1 S/P CABG (CORONARY ARTERY BYPASS GRAFT): Status: ACTIVE | Noted: 2023-01-08

## 2025-05-15 PROBLEM — E66.811 OBESITY, CLASS I, BMI 30-34.9: Status: ACTIVE | Noted: 2022-12-22

## 2025-05-15 PROBLEM — F41.0 PANIC ATTACK: Status: ACTIVE | Noted: 2025-05-15

## 2025-05-15 PROBLEM — M99.01 SOMATIC DYSFUNCTION OF CERVICAL REGION: Status: ACTIVE | Noted: 2025-05-15

## 2025-05-15 PROBLEM — J34.89 NASAL DISCHARGE: Status: ACTIVE | Noted: 2025-05-15

## 2025-05-15 PROBLEM — M25.562 KNEE PAIN, BILATERAL: Status: ACTIVE | Noted: 2025-05-15

## 2025-05-15 PROBLEM — L01.02 PUSTULAR FOLLICULITIS: Status: ACTIVE | Noted: 2025-05-15

## 2025-05-15 PROBLEM — J43.2 CENTRILOBULAR EMPHYSEMA (MULTI): Status: ACTIVE | Noted: 2023-01-08

## 2025-05-15 PROBLEM — J02.9 SORE THROAT: Status: ACTIVE | Noted: 2025-05-15

## 2025-05-15 PROBLEM — G89.18 POSTOPERATIVE PAIN: Status: ACTIVE | Noted: 2022-12-20

## 2025-05-15 PROBLEM — M79.671 HEEL PAIN, BILATERAL: Status: ACTIVE | Noted: 2025-05-15

## 2025-05-15 PROBLEM — Z86.0100 HISTORY OF COLONIC POLYPS: Status: ACTIVE | Noted: 2025-05-15

## 2025-05-15 PROBLEM — L57.0 KERATOSIS: Status: ACTIVE | Noted: 2025-05-15

## 2025-05-15 PROBLEM — Z98.890 HISTORY OF PNEUMONECTOMY: Status: ACTIVE | Noted: 2022-05-16

## 2025-05-15 PROBLEM — S93.601A SPRAIN OF RIGHT FOOT: Status: ACTIVE | Noted: 2025-05-15

## 2025-05-15 PROBLEM — M79.672 HEEL PAIN, BILATERAL: Status: ACTIVE | Noted: 2025-05-15

## 2025-05-15 PROBLEM — G47.00 INSOMNIA: Status: ACTIVE | Noted: 2025-05-15

## 2025-05-15 PROBLEM — R07.89 ATYPICAL CHEST PAIN: Status: ACTIVE | Noted: 2025-05-15

## 2025-05-15 PROBLEM — R19.7 DIARRHEA: Status: ACTIVE | Noted: 2025-05-15

## 2025-05-15 PROBLEM — J34.89 LESION OF NOSE: Status: ACTIVE | Noted: 2025-05-15

## 2025-05-15 PROBLEM — K57.90 DIVERTICULAR DISEASE: Status: ACTIVE | Noted: 2025-05-15

## 2025-05-15 PROBLEM — M25.561 KNEE PAIN, BILATERAL: Status: ACTIVE | Noted: 2025-05-15

## 2025-05-15 PROBLEM — I34.0 MITRAL VALVE INSUFFICIENCY: Status: ACTIVE | Noted: 2022-12-20

## 2025-05-15 PROBLEM — R05.9 COUGH: Status: ACTIVE | Noted: 2025-05-15

## 2025-05-15 PROBLEM — S86.912A STRAIN OF LEFT KNEE: Status: ACTIVE | Noted: 2025-05-15

## 2025-05-15 PROBLEM — F43.21 GRIEF: Status: ACTIVE | Noted: 2025-05-15

## 2025-05-15 PROBLEM — L98.9 DISORDER OF SCALP: Status: ACTIVE | Noted: 2025-05-15

## 2025-05-15 PROBLEM — K63.5 POLYP OF COLON: Status: ACTIVE | Noted: 2025-05-15

## 2025-05-15 PROBLEM — J98.11 ATELECTASIS: Status: ACTIVE | Noted: 2022-12-20

## 2025-05-15 PROBLEM — I48.92 ATRIAL FLUTTER WITH RAPID VENTRICULAR RESPONSE (MULTI): Status: ACTIVE | Noted: 2023-01-08

## 2025-05-15 PROBLEM — Z86.59 HISTORY OF DEPRESSION: Status: ACTIVE | Noted: 2025-05-15

## 2025-05-15 PROBLEM — M25.511 PAIN IN JOINT OF RIGHT SHOULDER: Status: ACTIVE | Noted: 2025-05-15

## 2025-05-15 PROBLEM — E87.6 HYPOKALEMIA: Status: ACTIVE | Noted: 2023-01-08

## 2025-05-15 PROBLEM — N39.0 ACUTE URINARY TRACT INFECTION: Status: ACTIVE | Noted: 2025-05-15

## 2025-05-15 PROBLEM — H92.02 OTALGIA, LEFT EAR: Status: ACTIVE | Noted: 2025-05-15

## 2025-05-15 PROBLEM — R45.4 ANGER: Status: ACTIVE | Noted: 2025-05-15

## 2025-05-15 PROBLEM — F41.9 ANXIETY: Status: ACTIVE | Noted: 2025-05-15

## 2025-05-15 PROBLEM — Z90.2 HISTORY OF PNEUMONECTOMY: Status: ACTIVE | Noted: 2022-05-16

## 2025-05-15 RX ORDER — FLUTICASONE FUROATE AND VILANTEROL TRIFENATATE 100; 25 UG/1; UG/1
POWDER RESPIRATORY (INHALATION)
COMMUNITY

## 2025-05-20 ENCOUNTER — APPOINTMENT (OUTPATIENT)
Dept: GASTROENTEROLOGY | Facility: CLINIC | Age: 70
End: 2025-05-20
Payer: MEDICARE

## 2025-05-20 VITALS
HEIGHT: 61 IN | OXYGEN SATURATION: 96 % | WEIGHT: 152 LBS | SYSTOLIC BLOOD PRESSURE: 118 MMHG | HEART RATE: 80 BPM | DIASTOLIC BLOOD PRESSURE: 76 MMHG | RESPIRATION RATE: 18 BRPM | BODY MASS INDEX: 28.7 KG/M2

## 2025-05-20 DIAGNOSIS — D64.9 ANEMIA, UNSPECIFIED TYPE: ICD-10-CM

## 2025-05-20 DIAGNOSIS — D12.6 TUBULAR ADENOMA OF COLON: Primary | ICD-10-CM

## 2025-05-20 PROCEDURE — 3008F BODY MASS INDEX DOCD: CPT | Performed by: NURSE PRACTITIONER

## 2025-05-20 PROCEDURE — 1123F ACP DISCUSS/DSCN MKR DOCD: CPT | Performed by: NURSE PRACTITIONER

## 2025-05-20 PROCEDURE — 1159F MED LIST DOCD IN RCRD: CPT | Performed by: NURSE PRACTITIONER

## 2025-05-20 PROCEDURE — 3078F DIAST BP <80 MM HG: CPT | Performed by: NURSE PRACTITIONER

## 2025-05-20 PROCEDURE — 3074F SYST BP LT 130 MM HG: CPT | Performed by: NURSE PRACTITIONER

## 2025-05-20 PROCEDURE — 1036F TOBACCO NON-USER: CPT | Performed by: NURSE PRACTITIONER

## 2025-05-20 PROCEDURE — 99213 OFFICE O/P EST LOW 20 MIN: CPT | Performed by: NURSE PRACTITIONER

## 2025-05-20 PROCEDURE — 1160F RVW MEDS BY RX/DR IN RCRD: CPT | Performed by: NURSE PRACTITIONER

## 2025-05-20 RX ORDER — SODIUM, POTASSIUM,MAG SULFATES 17.5-3.13G
SOLUTION, RECONSTITUTED, ORAL ORAL
Qty: 354 ML | Refills: 0 | Status: CANCELLED | OUTPATIENT
Start: 2025-05-20

## 2025-05-20 NOTE — PROGRESS NOTES
Subjective   Patient ID: Lit Mackay is a 69 y.o. female who presents for Anemia (Pt referred for anemia-requesting EGD and colonoscopy. Last Hemoglobin 9.1 on 4/17. Pt wants to discuss trying iron supplementation before proceeding with procedures first. Last colonoscopy 07/2021 with Dr. Ortiz. ).  HPI  Patient here for concerns for anemia.  No overt GI bleeding.  No weight loss or decreased appetite.  No bowel habit changes, abdominal pain, nausea or vomiting.  She denies GERD or dysphagia.  Takes Xarelto daily.  No NSAIDs, alcohol or smoking.  She is not keen on endoscopic investigations at this time.  She is aware that she is overdue for a surveillance colonoscopy.  Her  recently suffered a CVA and she is her his primary caretaker.  ->Colonoscopy 7/2021: +TA  - The examined portion of the ileum was normal.                         - Severe diverticulosis in the sigmoid colon and in                          the descending colon. There was narrowing of the colon                          in association with the diverticular opening.                         - External hemorrhoids.                         - One 5 mm polyp in the ascending colon, removed with                          a cold snare. Resected and retrieved.                         - One 2 mm polyp in the ascending colon, removed with                          a cold biopsy forceps. Resected and retrieved.                         - One 5 mm polyp in the descending colon, removed with                          a cold snare. Resected and retrieved.    ->Colonoscopy 2020:+TA  - The examined portion of the ileum was normal.                         - Diverticulosis in the sigmoid colon and in the                          descending colon.                         - External hemorrhoids.                         - One 25 mm polyp in the ascending colon, removed                          piecemeal using a hot snare. Resected and retrieved.                           Clips were placed.                         - Five 4 to 7 mm polyps in the ascending colon,                          removed with a cold snare. Resected and retrieved.                         - One 3 mm polyp in the ascending colon, removed with                          a cold biopsy forceps. Resected and retrieved.                         - Two 5 to 6 mm polyps in the transverse colon.  Medications Ordered Prior to Encounter[1]     Review of Systems   All other systems reviewed and are negative.      Objective   Physical Exam  Vitals reviewed.   Constitutional:       General: She is awake. She is not in acute distress.     Appearance: Normal appearance. She is well-developed and normal weight. She is not ill-appearing, toxic-appearing or diaphoretic.   HENT:      Head: Normocephalic and atraumatic.   Eyes:      Conjunctiva/sclera: Conjunctivae normal.   Neck:      Thyroid: No thyroid mass.      Trachea: Phonation normal.   Pulmonary:      Effort: Pulmonary effort is normal. No respiratory distress.      Breath sounds: Normal air entry. No decreased breath sounds.   Abdominal:      General: Bowel sounds are normal. There is no distension.      Palpations: Abdomen is soft.      Tenderness: There is no abdominal tenderness.   Musculoskeletal:      Cervical back: Neck supple.      Right lower leg: No edema.      Left lower leg: No edema.   Skin:     General: Skin is warm.      Capillary Refill: Capillary refill takes less than 2 seconds.      Coloration: Skin is not cyanotic, jaundiced or pale.   Neurological:      General: No focal deficit present.      Mental Status: She is alert and oriented to person, place, and time.      Cranial Nerves: Cranial nerves 2-12 are intact.      Motor: Motor function is intact. No weakness.      Gait: Gait normal.   Psychiatric:         Attention and Perception: Attention and perception normal.         Mood and Affect: Mood normal.         Speech: Speech normal.         Behavior:  "Behavior normal. Behavior is cooperative.       /76 (BP Location: Left arm, Patient Position: Sitting, BP Cuff Size: Adult)   Pulse 80   Resp 18   Ht 1.549 m (5' 0.98\")   Wt 68.9 kg (152 lb)   LMP  (LMP Unknown)   SpO2 96%   BMI 28.74 kg/m²      Lab Results   Component Value Date    WBC 9.6 01/23/2023    HGB 11.2 (L) 01/23/2023    HCT 36.6 01/23/2023    MCV 86 01/23/2023     01/23/2023           No lab exists for component: \"LABALBU\"    No results found for: \"AFP\"  Lab Results   Component Value Date    TSH 2.11 07/26/2021         Assessment/Plan   Diagnoses and all orders for this visit:  Tubular adenoma of colon  Anemia, unspecified type  -     CBC; Future  -     Iron and TIBC; Future  -     Folate; Future    69-year-old female here for microcytic anemia of unknown etiology.  Risk factor includes Xarelto use.  No overt signs of GI bleeding and no red flag symptoms such as weight loss or change in appetite.  She is overdue for a surveillance colonoscopy, but is not keen on proceeding with EGD or colonoscopy at this time.  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  Recommend she obtain labs for anemia.  Strongly recommend EGD and colonoscopy, if she changes her mind she will update me.       Nu Urbano CNP       [1]   Current Outpatient Medications on File Prior to Visit   Medication Sig Dispense Refill    albuterol 2.5 mg /3 mL (0.083 %) nebulizer solution Inhale.      albuterol 90 mcg/actuation inhaler INHALE 2 PUFFS BY MOUTH AS DIRECTED EVERY 4 HOURS AS NEEDED FOR WHEEZING /SHORTNESS OF BREATH      alendronate (Fosamax) 70 mg tablet Take by mouth.      amLODIPine (Norvasc) 2.5 mg tablet Take 1 tablet (2.5 mg) by mouth once daily.      aspirin 81 mg chewable tablet Chew 1 tablet (81 mg) once daily. CHEW AND SWALLOW      atorvastatin (Lipitor) 40 mg tablet       Breo Ellipta 100-25 mcg/dose inhaler INHALE 1 PUFF BY MOUTH ONCE DAILY AS INSTRUCTED      " clindamycin (Cleocin) 300 mg capsule Take 1 capsule 1 hour prior to procedure and 1 capsule 1 hour after procedure 10 capsule 0    dofetilide (Tikosyn) 125 mcg capsule Take 1 capsule (125 mcg) by mouth 2 times a day.      fluticasone propion-salmeteroL (Advair Diskus) 250-50 mcg/dose diskus inhaler Inhale 1 puff 2 times a day. Rinse mouth with water after use to reduce aftertaste and incidence of candidiasis. Do not swallow.      furosemide (Lasix) 40 mg tablet Take 2 tablets (80 mg) by mouth once daily.      ipratropium-albuteroL (Duo-Neb) 0.5-2.5 mg/3 mL nebulizer solution Take 3 mL by nebulization every 6 hours.      losartan (Cozaar) 25 mg tablet Take 1 tablet (25 mg) by mouth once daily.      metoprolol succinate XL (Toprol-XL) 50 mg 24 hr tablet Take 1 tablet (50 mg) by mouth 2 times a day. Do not crush or chew.      pantoprazole (ProtoNix) 20 mg EC tablet Take 2 tablets (40 mg) by mouth once daily in the morning. Take before meals. 180 tablet 1    potassium chloride CR 10 mEq ER tablet Take 1 tablet (10 mEq) by mouth 2 times a day. Do not crush, chew, or split.      predniSONE (Deltasone) 10 mg tablet 5 x 2 days, 4 x 2 days, 3 x 2 days, 2 x 2 days and 1 x 2 days 30 tablet 0    rivaroxaban (Xarelto) 20 mg tablet Take 1 tablet (20 mg) by mouth once daily. 90 tablet 0    sertraline (Zoloft) 100 mg tablet Take 2 tablets (200 mg) by mouth once daily at bedtime. 180 tablet 0    sertraline (Zoloft) 50 mg tablet Take 3 pills at bedtime for 5 days then 2 pills at bedtime for 5 days then 1 pill at bedtime for 5 days then DC 30 tablet 0     No current facility-administered medications on file prior to visit.

## 2025-05-28 ENCOUNTER — APPOINTMENT (OUTPATIENT)
Dept: PRIMARY CARE | Facility: CLINIC | Age: 70
End: 2025-05-28
Payer: MEDICARE

## 2025-05-28 VITALS
OXYGEN SATURATION: 94 % | SYSTOLIC BLOOD PRESSURE: 122 MMHG | DIASTOLIC BLOOD PRESSURE: 77 MMHG | RESPIRATION RATE: 16 BRPM | BODY MASS INDEX: 28.74 KG/M2 | HEART RATE: 68 BPM | TEMPERATURE: 97.9 F | WEIGHT: 152 LBS

## 2025-05-28 DIAGNOSIS — I48.19 PERSISTENT ATRIAL FIBRILLATION (MULTI): ICD-10-CM

## 2025-05-28 DIAGNOSIS — C34.90 MALIGNANT NEOPLASM OF UNSPECIFIED PART OF UNSPECIFIED BRONCHUS OR LUNG (MULTI): ICD-10-CM

## 2025-05-28 DIAGNOSIS — J44.9 CHRONIC OBSTRUCTIVE PULMONARY DISEASE, UNSPECIFIED COPD TYPE (MULTI): ICD-10-CM

## 2025-05-28 DIAGNOSIS — Z85.118 HISTORY OF LUNG CANCER: ICD-10-CM

## 2025-05-28 DIAGNOSIS — F32.A DEPRESSION, UNSPECIFIED DEPRESSION TYPE: Primary | ICD-10-CM

## 2025-05-28 DIAGNOSIS — Z95.1 S/P CABG (CORONARY ARTERY BYPASS GRAFT): ICD-10-CM

## 2025-05-28 DIAGNOSIS — I50.32 CHRONIC HEART FAILURE WITH PRESERVED EJECTION FRACTION: ICD-10-CM

## 2025-05-28 PROBLEM — I50.9 CHRONIC CONGESTIVE HEART FAILURE: Status: RESOLVED | Noted: 2025-02-25 | Resolved: 2025-05-28

## 2025-05-28 PROBLEM — J43.2 CENTRILOBULAR EMPHYSEMA (MULTI): Status: RESOLVED | Noted: 2023-01-08 | Resolved: 2025-05-28

## 2025-05-28 PROBLEM — I48.92 ATRIAL FLUTTER WITH RAPID VENTRICULAR RESPONSE (MULTI): Status: RESOLVED | Noted: 2023-01-08 | Resolved: 2025-05-28

## 2025-05-28 PROBLEM — I48.91 ATRIAL FIBRILLATION (MULTI): Status: RESOLVED | Noted: 2023-04-20 | Resolved: 2025-05-28

## 2025-05-28 PROCEDURE — 99214 OFFICE O/P EST MOD 30 MIN: CPT | Performed by: FAMILY MEDICINE

## 2025-05-28 ASSESSMENT — ENCOUNTER SYMPTOMS
LOSS OF SENSATION IN FEET: 0
DEPRESSION: 0
OCCASIONAL FEELINGS OF UNSTEADINESS: 0

## 2025-05-28 ASSESSMENT — PATIENT HEALTH QUESTIONNAIRE - PHQ9
SUM OF ALL RESPONSES TO PHQ9 QUESTIONS 1 AND 2: 0
2. FEELING DOWN, DEPRESSED OR HOPELESS: NOT AT ALL
1. LITTLE INTEREST OR PLEASURE IN DOING THINGS: NOT AT ALL

## 2025-05-28 NOTE — PROGRESS NOTES
Subjective   Patient ID: Lit Mackay is a 69 y.o. female who presents for Follow-up (She has been on the sertraline for a while and it has been increased a few times but it is no longer working for her depression. ).    HPI  Patient returns today for follow-up on her depression.  She is currently on a weaning process off her sertraline which she has been on for several years but has found it no longer working for her.  She had been at 200 mg a day and is currently down to 50 mg and has 4 days left on the wean.  So far she is doing okay with the wean.  Her daughter is on Wellbutrin and she would like to try that.  However that is in her allergy list from several years ago so I will need to research why it is on that list.    Patient is otherwise without complaints.    Review of Systems   All other systems reviewed and are negative.      Objective   Vitals:  /77   Pulse 68   Temp 36.6 °C (97.9 °F)   Resp 16   Wt 68.9 kg (152 lb)   LMP  (LMP Unknown)   SpO2 94%   BMI 28.74 kg/m²     Physical Exam  Vitals reviewed.   Constitutional:       Appearance: She is well-developed.   HENT:      Head: Normocephalic and atraumatic.      Right Ear: Tympanic membrane, ear canal and external ear normal.      Left Ear: Tympanic membrane, ear canal and external ear normal.      Nose: Nose normal.      Mouth/Throat:      Mouth: Mucous membranes are moist.      Pharynx: Oropharynx is clear.   Eyes:      Extraocular Movements: Extraocular movements intact.      Conjunctiva/sclera: Conjunctivae normal.      Pupils: Pupils are equal, round, and reactive to light.      Comments: Patient wears glasses   Cardiovascular:      Rate and Rhythm: Normal rate and regular rhythm.      Heart sounds: Normal heart sounds. No murmur heard.  Pulmonary:      Effort: Pulmonary effort is normal.   Abdominal:      General: Abdomen is flat. Bowel sounds are normal.      Palpations: Abdomen is soft.   Musculoskeletal:         General: Normal range  of motion.   Skin:     General: Skin is warm and dry.   Neurological:      General: No focal deficit present.      Mental Status: She is alert and oriented to person, place, and time. Mental status is at baseline.   Psychiatric:         Mood and Affect: Mood normal.         Behavior: Behavior normal.         Thought Content: Thought content normal.         Judgment: Judgment normal.         CBC -  Recent Labs     01/23/23  1131 07/26/21  1130 03/07/18  1617   WBC 9.6 6.5 6.8   HGB 11.2* 13.2 13.8   HCT 36.6 42.1 41.1    183 236   MCV 86 88 90       CMP -  Recent Labs     01/23/23  1131 07/26/21  1130 03/07/18  1617    138 139   K 3.5 4.5 4.0    101 101   CO2 29 30 29   ANIONGAP 14 12 13   BUN 12 16 13   CREATININE 0.97 1.02 0.97     Recent Labs     07/26/21  1130 03/07/18  1617   ALBUMIN 4.5 4.8   ALKPHOS 61 80   ALT 36 25   AST 32 20   BILITOT 0.5 0.4       LIPID PANEL -   Recent Labs     01/23/23  1131 07/26/21  1130 03/07/18  1617   CHOL 150 257* 305*   LDLF 52 156* 176*   HDL 46.7 53.0 49.4   TRIG 257* 239* 396*       Recent Labs     10/06/22  0746 03/07/18  1617   HGBA1C 5.8* 5.4       Lab Results   Component Value Date    LCEYHCFB06 732 03/07/2018       Lab Results   Component Value Date    VITD25 12 (A) 03/07/2018        Assessment/Plan   Problem List Items Addressed This Visit       RESOLVED: Chronic obstructive pulmonary disease (Multi)    Overview   COPD is stable         Depression - Primary    Malignant neoplasm of unspecified part of unspecified bronchus or lung (Multi)    Overview   Currently stable         RESOLVED: Atrial fibrillation (Multi)    Overview   Being followed by [facility], currently on Tikosyn.         RESOLVED: Chronic heart failure with preserved ejection fraction    History of lung cancer    S/P CABG (coronary artery bypass graft)     Continue to wean off of sertraline.  Start new medication in 4 days.  Continue follow-up with multiple specialists as scheduled.  Use meds  as directed.  Return to clinic if symptoms do not improve in 10-14 days.    Should the condition worsen contact me and return here or if after hours seek further evaluation at an urgent care or in the emergency room.     Patient currently being treated for depression.  Medication list reconciled.  Thank you for visiting today!        Professional services: Some of this note was completed using Dragon voice technology and sometimes the software misinterprets words. This may include unintended errors with respect to translation of words, typographical errors or grammar errors which may not have been identified prior to finalization of the chart note. Please take this into account when reading the note. Thank you.          Rob Gabriel DO 05/29/25 7:04 AM

## 2025-06-02 ENCOUNTER — TELEPHONE (OUTPATIENT)
Dept: PRIMARY CARE | Facility: CLINIC | Age: 70
End: 2025-06-02
Payer: MEDICARE

## 2025-06-02 DIAGNOSIS — F32.A DEPRESSION, UNSPECIFIED DEPRESSION TYPE: Primary | ICD-10-CM

## 2025-06-02 RX ORDER — ESCITALOPRAM OXALATE 5 MG/1
5 TABLET ORAL NIGHTLY
Qty: 30 TABLET | Refills: 5 | Status: SHIPPED | OUTPATIENT
Start: 2025-06-02 | End: 2025-11-29

## 2025-06-02 NOTE — TELEPHONE ENCOUNTER
I spoke with patient this morning.  After scouring her chart, I did find that she had bad sweating with the bupropion and it caused her to shake inside her body every morning.  So we will not use the bupropion.  I have suggested instead we try Lexapro.  Prescription sent to pharmacy.

## 2025-06-09 LAB
25(OH)D3+25(OH)D2 SERPL-MCNC: 31 NG/ML (ref 30–100)
ALBUMIN SERPL-MCNC: 4.4 G/DL (ref 3.6–5.1)
ALP SERPL-CCNC: 77 U/L (ref 37–153)
ALT SERPL-CCNC: 13 U/L (ref 6–29)
ANION GAP SERPL CALCULATED.4IONS-SCNC: 11 MMOL/L (CALC) (ref 7–17)
AST SERPL-CCNC: 18 U/L (ref 10–35)
BILIRUB SERPL-MCNC: 0.4 MG/DL (ref 0.2–1.2)
BUN SERPL-MCNC: 14 MG/DL (ref 7–25)
CALCIUM SERPL-MCNC: 9.1 MG/DL (ref 8.6–10.4)
CHLORIDE SERPL-SCNC: 102 MMOL/L (ref 98–110)
CHOLEST SERPL-MCNC: 141 MG/DL
CHOLEST/HDLC SERPL: 3.1 (CALC)
CO2 SERPL-SCNC: 29 MMOL/L (ref 20–32)
CREAT SERPL-MCNC: 1.01 MG/DL (ref 0.6–1)
EGFRCR SERPLBLD CKD-EPI 2021: 60 ML/MIN/1.73M2
ERYTHROCYTE [DISTWIDTH] IN BLOOD BY AUTOMATED COUNT: 17.7 % (ref 11–15)
ERYTHROCYTE [DISTWIDTH] IN BLOOD BY AUTOMATED COUNT: 17.8 % (ref 11–15)
FERRITIN SERPL-MCNC: 9 NG/ML (ref 16–288)
FOLATE SERPL-MCNC: 14.3 NG/ML
GLUCOSE SERPL-MCNC: 122 MG/DL (ref 65–99)
HCT VFR BLD AUTO: 32.4 % (ref 35–45)
HCT VFR BLD AUTO: 32.9 % (ref 35–45)
HDLC SERPL-MCNC: 46 MG/DL
HGB BLD-MCNC: 9 G/DL (ref 11.7–15.5)
HGB BLD-MCNC: 9.2 G/DL (ref 11.7–15.5)
IRON SATN MFR SERPL: 4 % (CALC) (ref 16–45)
IRON SATN MFR SERPL: 4 % (CALC) (ref 16–45)
IRON SERPL-MCNC: 16 MCG/DL (ref 45–160)
IRON SERPL-MCNC: 17 MCG/DL (ref 45–160)
LDLC SERPL CALC-MCNC: 74 MG/DL (CALC)
MCH RBC QN AUTO: 19.7 PG (ref 27–33)
MCH RBC QN AUTO: 20.5 PG (ref 27–33)
MCHC RBC AUTO-ENTMCNC: 27.4 G/DL (ref 32–36)
MCHC RBC AUTO-ENTMCNC: 28.4 G/DL (ref 32–36)
MCV RBC AUTO: 71.8 FL (ref 80–100)
MCV RBC AUTO: 72.2 FL (ref 80–100)
NONHDLC SERPL-MCNC: 95 MG/DL (CALC)
PLATELET # BLD AUTO: 220 THOUSAND/UL (ref 140–400)
PLATELET # BLD AUTO: 235 THOUSAND/UL (ref 140–400)
PMV BLD REES-ECKER: 10.2 FL (ref 7.5–12.5)
PMV BLD REES-ECKER: 10.8 FL (ref 7.5–12.5)
POTASSIUM SERPL-SCNC: 4.5 MMOL/L (ref 3.5–5.3)
PROT SERPL-MCNC: 7.5 G/DL (ref 6.1–8.1)
RBC # BLD AUTO: 4.49 MILLION/UL (ref 3.8–5.1)
RBC # BLD AUTO: 4.58 MILLION/UL (ref 3.8–5.1)
SODIUM SERPL-SCNC: 142 MMOL/L (ref 135–146)
TIBC SERPL-MCNC: 426 MCG/DL (CALC) (ref 250–450)
TIBC SERPL-MCNC: 430 MCG/DL (CALC) (ref 250–450)
TRIGL SERPL-MCNC: 131 MG/DL
VIT B12 SERPL-MCNC: 411 PG/ML (ref 200–1100)
WBC # BLD AUTO: 5.8 THOUSAND/UL (ref 3.8–10.8)
WBC # BLD AUTO: 6.1 THOUSAND/UL (ref 3.8–10.8)

## 2025-06-10 ENCOUNTER — TELEPHONE (OUTPATIENT)
Dept: GASTROENTEROLOGY | Facility: CLINIC | Age: 70
End: 2025-06-10
Payer: MEDICARE

## 2025-06-10 DIAGNOSIS — D50.0 IRON DEFICIENCY ANEMIA DUE TO CHRONIC BLOOD LOSS: Primary | ICD-10-CM

## 2025-06-10 RX ORDER — FERROUS SULFATE 325(65) MG
325 TABLET, DELAYED RELEASE (ENTERIC COATED) ORAL 2 TIMES DAILY
Qty: 180 TABLET | Refills: 3 | Status: SHIPPED | OUTPATIENT
Start: 2025-06-10 | End: 2026-06-10

## 2025-06-10 NOTE — TELEPHONE ENCOUNTER
Patient's hemoglobin is stable, but she has iron deficiency anemia.  She continues to refuse GI evaluation.  Will start the patient on iron supplementation twice daily.  I did offer her iron infusions, but she declined.  Recommend she follow-up with her PCP to follow her lab values/anemia since she has declined GI eval.

## 2025-06-24 ENCOUNTER — TELEPHONE (OUTPATIENT)
Dept: PRIMARY CARE | Facility: CLINIC | Age: 70
End: 2025-06-24
Payer: MEDICARE

## 2025-06-24 NOTE — TELEPHONE ENCOUNTER
Patient called in this morning stating that her anti depressant medication was changed at her last visit.  Patient is not sure if the doctor would like a follow up appointment or just speak with her over the phone?  Patient can be reached at [phone].          Thank You

## 2025-06-26 ENCOUNTER — OFFICE VISIT (OUTPATIENT)
Dept: PRIMARY CARE | Facility: CLINIC | Age: 70
End: 2025-06-26
Payer: MEDICARE

## 2025-06-26 VITALS
DIASTOLIC BLOOD PRESSURE: 78 MMHG | WEIGHT: 150 LBS | SYSTOLIC BLOOD PRESSURE: 116 MMHG | OXYGEN SATURATION: 98 % | RESPIRATION RATE: 15 BRPM | HEART RATE: 68 BPM | BODY MASS INDEX: 28.36 KG/M2 | TEMPERATURE: 97.8 F

## 2025-06-26 DIAGNOSIS — L03.116 CELLULITIS OF LEFT LOWER EXTREMITY: ICD-10-CM

## 2025-06-26 DIAGNOSIS — L02.429 BOIL, LEG: Primary | ICD-10-CM

## 2025-06-26 PROCEDURE — 1160F RVW MEDS BY RX/DR IN RCRD: CPT | Performed by: NURSE PRACTITIONER

## 2025-06-26 PROCEDURE — 3078F DIAST BP <80 MM HG: CPT | Performed by: NURSE PRACTITIONER

## 2025-06-26 PROCEDURE — 1036F TOBACCO NON-USER: CPT | Performed by: NURSE PRACTITIONER

## 2025-06-26 PROCEDURE — 3074F SYST BP LT 130 MM HG: CPT | Performed by: NURSE PRACTITIONER

## 2025-06-26 PROCEDURE — 1159F MED LIST DOCD IN RCRD: CPT | Performed by: NURSE PRACTITIONER

## 2025-06-26 PROCEDURE — 99214 OFFICE O/P EST MOD 30 MIN: CPT | Performed by: NURSE PRACTITIONER

## 2025-06-26 RX ORDER — CLINDAMYCIN HYDROCHLORIDE 300 MG/1
300 CAPSULE ORAL 2 TIMES DAILY
Qty: 20 CAPSULE | Refills: 0 | Status: SHIPPED | OUTPATIENT
Start: 2025-06-26 | End: 2025-07-06

## 2025-06-26 ASSESSMENT — ENCOUNTER SYMPTOMS
WOUND: 1
RESPIRATORY NEGATIVE: 1
APPETITE CHANGE: 0
FATIGUE: 0
GASTROINTESTINAL NEGATIVE: 1
CONSTITUTIONAL NEGATIVE: 1
FEVER: 0

## 2025-06-26 NOTE — PROGRESS NOTES
Patient has had a cyst in her left inner thigh for a long time. Patient says that since Monday, the cyst has become red and tender. There is more redness surrounding the cyst. It seems to be worsening. She has been using a warm compress and it has helped expel some drainage. No fevers. She is feeling well otherwise.     Review of Systems   Constitutional: Negative.  Negative for appetite change, fatigue and fever.   HENT: Negative.     Respiratory: Negative.     Gastrointestinal: Negative.    Skin:  Positive for wound.     Objective   /78   Pulse 68   Temp 36.6 °C (97.8 °F) (Temporal)   Resp 15   Wt 68 kg (150 lb)   LMP  (LMP Unknown)   SpO2 98%   BMI 28.36 kg/m²     Physical Exam  Vitals reviewed.   Constitutional:       General: She is not in acute distress.     Appearance: Normal appearance. She is not ill-appearing or toxic-appearing.   HENT:      Head: Atraumatic.   Cardiovascular:      Rate and Rhythm: Normal rate and regular rhythm.      Heart sounds: Normal heart sounds. No murmur heard.  Pulmonary:      Effort: Pulmonary effort is normal.      Breath sounds: Normal breath sounds. No wheezing or rhonchi.   Abdominal:      General: Bowel sounds are normal.      Palpations: Abdomen is soft.      Tenderness: There is no abdominal tenderness. There is no guarding or rebound.   Skin:     Findings: Erythema present.      Comments: Patient with a cyst on the inner left thigh measuring 2 cm x 2 cm. There is no active drainage. Pt has some pain to the area with palpation. There is an area of redness surrounding the cyst measuring 9 cm wide and 7 cm long. The area of redness was outlined with a surgical skin marker    Neurological:      General: No focal deficit present.      Mental Status: She is alert.   Psychiatric:         Mood and Affect: Mood normal.     Assessment/Plan   Problem List Items Addressed This Visit    None  Visit Diagnoses         Codes      Boil, leg    -  Primary L02.429    Relevant  Medications    clindamycin (Cleocin) 300 mg capsule    Other Relevant Orders    Referral to Wound Clinic      Cellulitis of left lower extremity     L03.116    Relevant Medications    clindamycin (Cleocin) 300 mg capsule    Other Relevant Orders    Referral to Wound Clinic        Pt started on clindamycin at this time as she has several drug allergies and has tolerated this in the past. Pt encouraged to wash area with Gold Dial soap. Pt to see wound care tomorrow at 1 pm. She is aware of the date, time and location of the appt. She may continue warm compresses. Reminded pt not to poke at the area or try to drain it. Advised ER for any worsening pain, redness, tenderness, fevers, chills or new/concerning symptoms; she agreed. Pt to follow up tomorrow as scheduled.

## 2025-06-27 ENCOUNTER — OFFICE VISIT (OUTPATIENT)
Dept: WOUND CARE | Facility: CLINIC | Age: 70
End: 2025-06-27
Payer: MEDICARE

## 2025-06-27 DIAGNOSIS — L02.416 ABSCESS OF LEFT THIGH: Primary | ICD-10-CM

## 2025-06-27 PROCEDURE — 99213 OFFICE O/P EST LOW 20 MIN: CPT | Mod: 25

## 2025-06-27 PROCEDURE — 10060 I&D ABSCESS SIMPLE/SINGLE: CPT

## 2025-06-29 LAB
BACTERIA SPEC AEROBE CULT: NORMAL
BACTERIA SPEC ANAEROBE CULT: NORMAL

## 2025-07-01 ENCOUNTER — OFFICE VISIT (OUTPATIENT)
Dept: WOUND CARE | Facility: CLINIC | Age: 70
End: 2025-07-01
Payer: MEDICARE

## 2025-07-01 PROCEDURE — 99212 OFFICE O/P EST SF 10 MIN: CPT

## 2025-07-03 DIAGNOSIS — F32.A DEPRESSION, UNSPECIFIED DEPRESSION TYPE: ICD-10-CM

## 2025-07-03 LAB
BACTERIA SPEC AEROBE CULT: NORMAL
BACTERIA SPEC ANAEROBE CULT: NORMAL

## 2025-07-07 ENCOUNTER — TELEPHONE (OUTPATIENT)
Dept: PRIMARY CARE | Facility: CLINIC | Age: 70
End: 2025-07-07
Payer: MEDICARE

## 2025-07-07 RX ORDER — ESCITALOPRAM OXALATE 5 MG/1
15 TABLET ORAL NIGHTLY
Qty: 90 TABLET | Refills: 0 | Status: SHIPPED | OUTPATIENT
Start: 2025-07-07 | End: 2025-08-06

## 2025-07-07 NOTE — TELEPHONE ENCOUNTER
Patient called stating that she feels her anti depressant medication is not working that she feels she needs something stronger.    Please advise

## 2025-07-07 NOTE — TELEPHONE ENCOUNTER
I spoke with patient this afternoon and recommended that she increase the Lexapro to 10 mg at bedtime for the next 2 weeks.  If it still feels like is not working then increase it to 15 mg at bedtime and follow-up here in the office in 4 to 6 weeks.  She will do so.

## 2025-07-07 NOTE — TELEPHONE ENCOUNTER
Pt states she has been taking the lexapro 5 mg at bedtime daily for about 1 month, and feels it is not doing anything whatsoever. She asks if the dose can be increased, please advise.

## 2025-07-08 ENCOUNTER — OFFICE VISIT (OUTPATIENT)
Dept: WOUND CARE | Facility: CLINIC | Age: 70
End: 2025-07-08
Payer: MEDICARE

## 2025-07-08 PROCEDURE — 99212 OFFICE O/P EST SF 10 MIN: CPT

## 2025-08-21 ENCOUNTER — APPOINTMENT (OUTPATIENT)
Dept: PRIMARY CARE | Facility: CLINIC | Age: 70
End: 2025-08-21
Payer: MEDICARE

## 2025-08-21 VITALS
HEART RATE: 64 BPM | OXYGEN SATURATION: 96 % | RESPIRATION RATE: 16 BRPM | BODY MASS INDEX: 29.68 KG/M2 | DIASTOLIC BLOOD PRESSURE: 76 MMHG | TEMPERATURE: 97.3 F | SYSTOLIC BLOOD PRESSURE: 125 MMHG | WEIGHT: 157 LBS

## 2025-08-21 DIAGNOSIS — I48.19 PERSISTENT ATRIAL FIBRILLATION (MULTI): ICD-10-CM

## 2025-08-21 DIAGNOSIS — Z95.1 S/P CABG (CORONARY ARTERY BYPASS GRAFT): ICD-10-CM

## 2025-08-21 DIAGNOSIS — I50.32 CHRONIC HEART FAILURE WITH PRESERVED EJECTION FRACTION: ICD-10-CM

## 2025-08-21 DIAGNOSIS — J44.9 CHRONIC OBSTRUCTIVE PULMONARY DISEASE, UNSPECIFIED COPD TYPE (MULTI): ICD-10-CM

## 2025-08-21 DIAGNOSIS — F32.A DEPRESSION, UNSPECIFIED DEPRESSION TYPE: Primary | ICD-10-CM

## 2025-08-21 DIAGNOSIS — Z85.118 HISTORY OF LUNG CANCER: ICD-10-CM

## 2025-08-21 PROCEDURE — 99214 OFFICE O/P EST MOD 30 MIN: CPT | Performed by: FAMILY MEDICINE

## 2025-08-21 RX ORDER — ESCITALOPRAM OXALATE 20 MG/1
20 TABLET ORAL NIGHTLY
Qty: 30 TABLET | Refills: 1 | Status: SHIPPED | OUTPATIENT
Start: 2025-08-21 | End: 2025-10-20

## 2025-09-02 DIAGNOSIS — K21.9 GASTROESOPHAGEAL REFLUX DISEASE, UNSPECIFIED WHETHER ESOPHAGITIS PRESENT: ICD-10-CM

## 2025-09-02 DIAGNOSIS — F32.A DEPRESSION, UNSPECIFIED DEPRESSION TYPE: Primary | ICD-10-CM

## 2025-09-02 RX ORDER — PANTOPRAZOLE SODIUM 20 MG/1
TABLET, DELAYED RELEASE ORAL
Qty: 180 TABLET | Refills: 0 | Status: SHIPPED | OUTPATIENT
Start: 2025-09-02

## 2025-09-05 RX ORDER — MIRTAZAPINE 7.5 MG/1
7.5 TABLET, FILM COATED ORAL NIGHTLY
Qty: 30 TABLET | Refills: 2 | Status: SHIPPED | OUTPATIENT
Start: 2025-09-05 | End: 2025-12-04

## 2025-09-22 ENCOUNTER — APPOINTMENT (OUTPATIENT)
Dept: PRIMARY CARE | Facility: CLINIC | Age: 70
End: 2025-09-22
Payer: MEDICARE

## 2026-05-05 ENCOUNTER — APPOINTMENT (OUTPATIENT)
Dept: PRIMARY CARE | Facility: CLINIC | Age: 71
End: 2026-05-05
Payer: MEDICARE